# Patient Record
Sex: FEMALE | Race: WHITE | Employment: PART TIME | URBAN - METROPOLITAN AREA
[De-identification: names, ages, dates, MRNs, and addresses within clinical notes are randomized per-mention and may not be internally consistent; named-entity substitution may affect disease eponyms.]

---

## 2018-11-30 PROBLEM — E66.01 OBESITY, MORBID (HCC): Status: ACTIVE | Noted: 2018-11-30

## 2019-01-25 ENCOUNTER — HOSPITAL ENCOUNTER (OUTPATIENT)
Dept: GENERAL RADIOLOGY | Age: 59
Discharge: HOME | End: 2019-01-25
Payer: COMMERCIAL

## 2019-01-25 DIAGNOSIS — M79.671 RIGHT FOOT PAIN: ICD-10-CM

## 2019-01-25 PROBLEM — G47.33 OSA (OBSTRUCTIVE SLEEP APNEA): Status: ACTIVE | Noted: 2019-01-25

## 2019-01-25 PROCEDURE — 73630 X-RAY EXAM OF FOOT: CPT

## 2019-03-08 PROBLEM — C80.1 CANCER (HCC): Status: ACTIVE | Noted: 2019-03-08

## 2019-03-08 PROBLEM — E11.9 CONTROLLED TYPE 2 DIABETES MELLITUS, WITHOUT LONG-TERM CURRENT USE OF INSULIN (HCC): Status: ACTIVE | Noted: 2019-03-08

## 2019-03-08 PROBLEM — I10 HYPERTENSION: Status: ACTIVE | Noted: 2019-03-08

## 2019-03-22 ENCOUNTER — HOSPITAL ENCOUNTER (OUTPATIENT)
Dept: SLEEP MEDICINE | Age: 59
Discharge: HOME | End: 2019-03-22
Payer: COMMERCIAL

## 2019-03-22 PROCEDURE — 95806 SLEEP STUDY UNATT&RESP EFFT: CPT

## 2019-09-06 ENCOUNTER — HOSPITAL ENCOUNTER (OUTPATIENT)
Dept: MAMMOGRAPHY | Age: 59
Discharge: HOME | End: 2019-09-06
Attending: FAMILY MEDICINE
Payer: COMMERCIAL

## 2019-09-06 DIAGNOSIS — Z12.39 BREAST SCREENING, UNSPECIFIED: ICD-10-CM

## 2019-09-06 PROCEDURE — 77067 SCR MAMMO BI INCL CAD: CPT

## 2019-09-06 NOTE — LETTER
[facility] [address] [address] [address]                                       Date: 9/26/2019  
[address] [place], [address] [place] [address] Street Dear MsTye Koki Heard, Thank you for [facility] for your [facility]. We are pleased to inform you that the results of your recent breast imaging examination done on 9/6/19 are normal/benign (not cancer). To continue your breast health regimen the Radiologist has recommended:  
Screening Mammogram in 1 year - Bilateral  
 
  
Although mammography is the most accurate method for early detection, not all cancers are found through mammography. A breast finding of concern should never be ignored despite a normal mammogram. Any changes in your breast(s) should be brought to the attention of your healthcare provider immediately. Your images will become part of your medical file here at [address] and will be available for your continuing care. You are responsible for informing any new healthcare provider or breast imaging facility of the date and location of this examination. Sincerely,  
  
[facility] [address] If you have Medicare Insurance, you must make your appointment no sooner than 12 months from now.

## 2020-02-21 ENCOUNTER — HOSPITAL ENCOUNTER (OUTPATIENT)
Dept: GENERAL RADIOLOGY | Age: 60
Discharge: HOME | End: 2020-02-21
Payer: COMMERCIAL

## 2020-02-21 DIAGNOSIS — M79.671 HEEL PAIN, CHRONIC, RIGHT: ICD-10-CM

## 2020-02-21 DIAGNOSIS — M54.2 NECK PAIN: ICD-10-CM

## 2020-02-21 DIAGNOSIS — G89.29 HEEL PAIN, CHRONIC, RIGHT: ICD-10-CM

## 2020-02-21 PROCEDURE — 72040 X-RAY EXAM NECK SPINE 2-3 VW: CPT

## 2020-02-21 PROCEDURE — 73650 X-RAY EXAM OF HEEL: CPT

## 2020-02-21 NOTE — PROGRESS NOTES
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 We can send her to physical therapy but really everything is in line and this is just an arthritic problem. He also has some arthritis in her heel and going to see the orthopedist is the right thing to do. Have her let me know if she would like to go to physical therapy with regards to her neck otherwise I like to check her back in 4 to 6 weeks.

## 2020-12-22 PROBLEM — M47.812 SPONDYLOSIS OF CERVICAL JOINT: Status: ACTIVE | Noted: 2020-12-22

## 2020-12-22 PROBLEM — Z85.828 HISTORY OF SKIN CANCER: Status: ACTIVE | Noted: 2019-03-08

## 2020-12-22 PROBLEM — M19.171 POST-TRAUMATIC OSTEOARTHRITIS OF RIGHT ANKLE: Status: ACTIVE | Noted: 2020-12-22

## 2021-03-30 ENCOUNTER — APPOINTMENT (RX ONLY)
Dept: URBAN - METROPOLITAN AREA CLINIC 23 | Facility: CLINIC | Age: 61
Setting detail: DERMATOLOGY
End: 2021-03-30

## 2021-03-30 DIAGNOSIS — L57.8 OTHER SKIN CHANGES DUE TO CHRONIC EXPOSURE TO NONIONIZING RADIATION: ICD-10-CM

## 2021-03-30 DIAGNOSIS — D18.0 HEMANGIOMA: ICD-10-CM

## 2021-03-30 DIAGNOSIS — L82.1 OTHER SEBORRHEIC KERATOSIS: ICD-10-CM

## 2021-03-30 DIAGNOSIS — Z71.89 OTHER SPECIFIED COUNSELING: ICD-10-CM

## 2021-03-30 DIAGNOSIS — D22 MELANOCYTIC NEVI: ICD-10-CM

## 2021-03-30 DIAGNOSIS — L57.0 ACTINIC KERATOSIS: ICD-10-CM

## 2021-03-30 PROBLEM — D22.71 MELANOCYTIC NEVI OF RIGHT LOWER LIMB, INCLUDING HIP: Status: ACTIVE | Noted: 2021-03-30

## 2021-03-30 PROBLEM — D18.01 HEMANGIOMA OF SKIN AND SUBCUTANEOUS TISSUE: Status: ACTIVE | Noted: 2021-03-30

## 2021-03-30 PROCEDURE — 99203 OFFICE O/P NEW LOW 30 MIN: CPT

## 2021-03-30 PROCEDURE — ? PRESCRIPTION

## 2021-03-30 PROCEDURE — ? COUNSELING

## 2021-03-30 RX ORDER — FLUOROURACIL 2 G/40G
CREAM TOPICAL ONCE DAILY
Qty: 1 | Refills: 0 | Status: ERX | COMMUNITY
Start: 2021-03-30

## 2021-03-30 RX ADMIN — FLUOROURACIL: 2 CREAM TOPICAL at 00:00

## 2021-03-30 ASSESSMENT — LOCATION SIMPLE DESCRIPTION DERM
LOCATION SIMPLE: RIGHT THIGH
LOCATION SIMPLE: LEFT SHOULDER
LOCATION SIMPLE: RIGHT UPPER BACK
LOCATION SIMPLE: CHEST
LOCATION SIMPLE: RIGHT CHEEK
LOCATION SIMPLE: RIGHT FOREHEAD
LOCATION SIMPLE: LEFT LOWER BACK
LOCATION SIMPLE: ABDOMEN

## 2021-03-30 ASSESSMENT — LOCATION ZONE DERM
LOCATION ZONE: TRUNK
LOCATION ZONE: LEG
LOCATION ZONE: FACE
LOCATION ZONE: ARM

## 2021-03-30 ASSESSMENT — LOCATION DETAILED DESCRIPTION DERM
LOCATION DETAILED: RIGHT SUPERIOR MEDIAL UPPER BACK
LOCATION DETAILED: LEFT SUPERIOR LATERAL MIDBACK
LOCATION DETAILED: RIGHT INFERIOR MEDIAL FOREHEAD
LOCATION DETAILED: LEFT ANTERIOR SHOULDER
LOCATION DETAILED: RIGHT ANTERIOR DISTAL THIGH
LOCATION DETAILED: UPPER STERNUM
LOCATION DETAILED: RIGHT FOREHEAD
LOCATION DETAILED: LEFT LATERAL ABDOMEN
LOCATION DETAILED: RIGHT INFERIOR MEDIAL MALAR CHEEK

## 2021-03-30 NOTE — HPI: FULL BODY SKIN EXAMINATION
What Is The Reason For Today's Visit?: Full Body Skin Examination
What Is The Reason For Today's Visit? (Being Monitored For X): concerning skin lesions on an annual basis
How Severe Are Your Spot(S)?: mild
Additional History: Pt gave verbal consent for treatment/bx today. Witnessed by melisa

## 2021-04-09 ENCOUNTER — HOSPITAL ENCOUNTER (OUTPATIENT)
Dept: MAMMOGRAPHY | Age: 61
Discharge: HOME | End: 2021-04-09
Attending: NURSE PRACTITIONER
Payer: COMMERCIAL

## 2021-04-09 DIAGNOSIS — Z12.31 BREAST CANCER SCREENING BY MAMMOGRAM: ICD-10-CM

## 2021-04-09 PROCEDURE — 77067 SCR MAMMO BI INCL CAD: CPT

## 2021-04-13 NOTE — PROGRESS NOTES
Her mammogram showed no evidence for malignancy. A follow-up is recommended for routine mammogram screening next year.

## 2021-06-21 ENCOUNTER — APPOINTMENT (RX ONLY)
Dept: URBAN - METROPOLITAN AREA CLINIC 23 | Facility: CLINIC | Age: 61
Setting detail: DERMATOLOGY
End: 2021-06-21

## 2021-06-21 DIAGNOSIS — L57.0 ACTINIC KERATOSIS: ICD-10-CM

## 2021-06-21 DIAGNOSIS — L57.8 OTHER SKIN CHANGES DUE TO CHRONIC EXPOSURE TO NONIONIZING RADIATION: ICD-10-CM

## 2021-06-21 PROCEDURE — 99213 OFFICE O/P EST LOW 20 MIN: CPT

## 2021-06-21 PROCEDURE — ? TREATMENT REGIMEN

## 2021-06-21 PROCEDURE — ? OTHER

## 2021-06-21 PROCEDURE — ? COUNSELING

## 2021-06-21 ASSESSMENT — LOCATION SIMPLE DESCRIPTION DERM
LOCATION SIMPLE: RIGHT FOREHEAD
LOCATION SIMPLE: RIGHT NOSE

## 2021-06-21 ASSESSMENT — LOCATION DETAILED DESCRIPTION DERM
LOCATION DETAILED: RIGHT NASAL SIDEWALL
LOCATION DETAILED: RIGHT INFERIOR FOREHEAD
LOCATION DETAILED: RIGHT MEDIAL FOREHEAD

## 2021-06-21 ASSESSMENT — LOCATION ZONE DERM
LOCATION ZONE: NOSE
LOCATION ZONE: FACE

## 2021-06-21 NOTE — PROCEDURE: OTHER
Note Text (......Xxx Chief Complaint.): This diagnosis correlates with the
Other (Free Text): AK’s treated with efudex have resolved, good response
Detail Level: Detailed
Render Risk Assessment In Note?: no

## 2021-07-27 ENCOUNTER — HOSPITAL ENCOUNTER (OUTPATIENT)
Dept: ULTRASOUND IMAGING | Age: 61
Discharge: HOME | End: 2021-07-27
Attending: NURSE PRACTITIONER
Payer: COMMERCIAL

## 2021-07-27 DIAGNOSIS — R79.89 ELEVATED LFTS: ICD-10-CM

## 2021-07-27 PROCEDURE — 76705 ECHO EXAM OF ABDOMEN: CPT

## 2021-07-27 NOTE — PROGRESS NOTES
Ultrasound compatible with fatty liver. For fatty liver treatment, it is recommended to follow strict low sugar/diabetic diet, exercise 150 mins/week, weight loss, and healthy diet avoiding high saturated fat/cholesterol foods. We will continue to trend her liver function tests periodically. If she has any questions, please let me know. Thanks.

## 2021-11-19 PROBLEM — K76.0 FATTY LIVER: Status: ACTIVE | Noted: 2021-11-19

## 2022-02-18 PROBLEM — N39.0 URINARY TRACT INFECTION WITH HEMATURIA: Status: ACTIVE | Noted: 2022-02-18

## 2022-02-18 PROBLEM — R30.0 DYSURIA: Status: ACTIVE | Noted: 2022-02-18

## 2022-02-18 PROBLEM — R31.9 URINARY TRACT INFECTION WITH HEMATURIA: Status: ACTIVE | Noted: 2022-02-18

## 2022-03-18 PROBLEM — R31.9 URINARY TRACT INFECTION WITH HEMATURIA: Status: ACTIVE | Noted: 2022-02-18

## 2022-03-18 PROBLEM — N39.0 URINARY TRACT INFECTION WITH HEMATURIA: Status: ACTIVE | Noted: 2022-02-18

## 2022-03-18 PROBLEM — M19.171 POST-TRAUMATIC OSTEOARTHRITIS OF RIGHT ANKLE: Status: ACTIVE | Noted: 2020-12-22

## 2022-03-18 PROBLEM — K76.0 FATTY LIVER: Status: ACTIVE | Noted: 2021-11-19

## 2022-03-19 PROBLEM — E11.9 CONTROLLED TYPE 2 DIABETES MELLITUS, WITHOUT LONG-TERM CURRENT USE OF INSULIN (HCC): Status: ACTIVE | Noted: 2019-03-08

## 2022-03-19 PROBLEM — Z85.828 HISTORY OF SKIN CANCER: Status: ACTIVE | Noted: 2019-03-08

## 2022-03-19 PROBLEM — G47.33 OSA (OBSTRUCTIVE SLEEP APNEA): Status: ACTIVE | Noted: 2019-01-25

## 2022-03-19 PROBLEM — I10 HYPERTENSION: Status: ACTIVE | Noted: 2019-03-08

## 2022-03-19 PROBLEM — E66.01 OBESITY, MORBID (HCC): Status: ACTIVE | Noted: 2018-11-30

## 2022-03-19 PROBLEM — R30.0 DYSURIA: Status: ACTIVE | Noted: 2022-02-18

## 2022-03-20 PROBLEM — M47.812 SPONDYLOSIS OF CERVICAL JOINT: Status: ACTIVE | Noted: 2020-12-22

## 2022-04-04 ENCOUNTER — APPOINTMENT (RX ONLY)
Dept: URBAN - METROPOLITAN AREA CLINIC 23 | Facility: CLINIC | Age: 62
Setting detail: DERMATOLOGY
End: 2022-04-04

## 2022-04-04 DIAGNOSIS — L85.3 XEROSIS CUTIS: ICD-10-CM

## 2022-04-04 DIAGNOSIS — L57.8 OTHER SKIN CHANGES DUE TO CHRONIC EXPOSURE TO NONIONIZING RADIATION: ICD-10-CM

## 2022-04-04 DIAGNOSIS — D22 MELANOCYTIC NEVI: ICD-10-CM

## 2022-04-04 DIAGNOSIS — L81.4 OTHER MELANIN HYPERPIGMENTATION: ICD-10-CM

## 2022-04-04 DIAGNOSIS — D18.0 HEMANGIOMA: ICD-10-CM

## 2022-04-04 DIAGNOSIS — L57.0 ACTINIC KERATOSIS: ICD-10-CM | Status: RESOLVED

## 2022-04-04 DIAGNOSIS — L82.1 OTHER SEBORRHEIC KERATOSIS: ICD-10-CM

## 2022-04-04 PROBLEM — D22.71 MELANOCYTIC NEVI OF RIGHT LOWER LIMB, INCLUDING HIP: Status: ACTIVE | Noted: 2022-04-04

## 2022-04-04 PROBLEM — D18.01 HEMANGIOMA OF SKIN AND SUBCUTANEOUS TISSUE: Status: ACTIVE | Noted: 2022-04-04

## 2022-04-04 PROCEDURE — ? COUNSELING

## 2022-04-04 PROCEDURE — 99213 OFFICE O/P EST LOW 20 MIN: CPT

## 2022-04-04 PROCEDURE — ? OTHER

## 2022-04-04 ASSESSMENT — LOCATION SIMPLE DESCRIPTION DERM
LOCATION SIMPLE: RIGHT THIGH
LOCATION SIMPLE: CHEST
LOCATION SIMPLE: UPPER BACK
LOCATION SIMPLE: RIGHT FOREARM
LOCATION SIMPLE: UPPER LIP
LOCATION SIMPLE: NOSE
LOCATION SIMPLE: GROIN
LOCATION SIMPLE: LOWER BACK
LOCATION SIMPLE: ABDOMEN
LOCATION SIMPLE: LEFT FOREARM
LOCATION SIMPLE: LEFT SHOULDER
LOCATION SIMPLE: RIGHT SHOULDER
LOCATION SIMPLE: LEFT UPPER BACK
LOCATION SIMPLE: LEFT SCALP

## 2022-04-04 ASSESSMENT — LOCATION DETAILED DESCRIPTION DERM
LOCATION DETAILED: PHILTRUM
LOCATION DETAILED: EPIGASTRIC SKIN
LOCATION DETAILED: NASAL DORSUM
LOCATION DETAILED: LEFT PROXIMAL DORSAL FOREARM
LOCATION DETAILED: UPPER STERNUM
LOCATION DETAILED: LEFT POSTERIOR SHOULDER
LOCATION DETAILED: SUPERIOR THORACIC SPINE
LOCATION DETAILED: RIGHT DISTAL RADIAL DORSAL FOREARM
LOCATION DETAILED: LEFT SUPRAPUBIC SKIN
LOCATION DETAILED: RIGHT ANTERIOR DISTAL THIGH
LOCATION DETAILED: RIGHT PROXIMAL DORSAL FOREARM
LOCATION DETAILED: SUPERIOR LUMBAR SPINE
LOCATION DETAILED: LEFT MEDIAL FRONTAL SCALP
LOCATION DETAILED: LEFT ANTERIOR SHOULDER
LOCATION DETAILED: RIGHT POSTERIOR SHOULDER
LOCATION DETAILED: LEFT MID-UPPER BACK

## 2022-04-04 ASSESSMENT — LOCATION ZONE DERM
LOCATION ZONE: LEG
LOCATION ZONE: ARM
LOCATION ZONE: NOSE
LOCATION ZONE: SCALP
LOCATION ZONE: LIP
LOCATION ZONE: TRUNK

## 2022-04-04 NOTE — PROCEDURE: OTHER
Render Risk Assessment In Note?: no
Detail Level: Detailed
Note Text (......Xxx Chief Complaint.): This diagnosis correlates with the
Other (Free Text): Treated with Efudex

## 2022-04-08 ENCOUNTER — HOSPITAL ENCOUNTER (OUTPATIENT)
Dept: MAMMOGRAPHY | Age: 62
Discharge: HOME | End: 2022-04-08
Attending: NURSE PRACTITIONER
Payer: COMMERCIAL

## 2022-04-08 DIAGNOSIS — Z80.3 FAMILY HISTORY OF BREAST CANCER IN MOTHER: ICD-10-CM

## 2022-04-08 DIAGNOSIS — N63.20 MASS OF LEFT BREAST, UNSPECIFIED QUADRANT: ICD-10-CM

## 2022-04-08 PROBLEM — Z86.010 HISTORY OF COLON POLYPS: Status: ACTIVE | Noted: 2022-04-08

## 2022-04-08 PROBLEM — K21.9 GASTROESOPHAGEAL REFLUX DISEASE WITHOUT ESOPHAGITIS: Status: ACTIVE | Noted: 2022-04-08

## 2022-04-08 PROCEDURE — 77062 BREAST TOMOSYNTHESIS BI: CPT

## 2022-04-08 PROCEDURE — 76642 ULTRASOUND BREAST LIMITED: CPT

## 2022-04-11 PROBLEM — K21.9 GASTROESOPHAGEAL REFLUX DISEASE WITHOUT ESOPHAGITIS: Status: ACTIVE | Noted: 2022-04-08

## 2022-04-11 PROBLEM — Z86.0100 HISTORY OF COLON POLYPS: Status: ACTIVE | Noted: 2022-04-08

## 2022-04-11 PROBLEM — Z86.010 HISTORY OF COLON POLYPS: Status: ACTIVE | Noted: 2022-04-08

## 2022-04-11 NOTE — PROGRESS NOTES
Patient notified and verbalized understanding    Pt scheduled for labs and she will call Rolando Recinos to get apt scheduled

## 2022-04-11 NOTE — PROGRESS NOTES
Her ultrasound and mammogram showed possible small abscess. I ordered Keflex antibiotic to take twice daily for 10 days. She will need to follow up in 4 weeks with [facility] for follow up imaging. Does she have an appt? Her urine was most likely contaminated. The specimen did show some blood. Please schedule for her to resubmit a urine sample (clean catch) in about 4 weeks for lab visit.

## 2022-05-06 ENCOUNTER — HOSPITAL ENCOUNTER (OUTPATIENT)
Dept: MAMMOGRAPHY | Age: 62
Discharge: HOME | End: 2022-05-06
Attending: NURSE PRACTITIONER
Payer: COMMERCIAL

## 2022-05-06 DIAGNOSIS — N64.9 LESION OF BREAST: ICD-10-CM

## 2022-05-06 PROCEDURE — 76642 ULTRASOUND BREAST LIMITED: CPT

## 2022-05-13 ENCOUNTER — HOSPITAL ENCOUNTER (OUTPATIENT)
Dept: MAMMOGRAPHY | Age: 62
Discharge: HOME | End: 2022-05-13
Attending: NURSE PRACTITIONER
Payer: COMMERCIAL

## 2022-05-13 VITALS — HEART RATE: 73 BPM | SYSTOLIC BLOOD PRESSURE: 125 MMHG | DIASTOLIC BLOOD PRESSURE: 68 MMHG

## 2022-05-13 DIAGNOSIS — N63.20 MASS OF LEFT BREAST: ICD-10-CM

## 2022-05-13 DIAGNOSIS — R92.8 ABNORMAL MAMMOGRAM OF LEFT BREAST: ICD-10-CM

## 2022-05-13 PROCEDURE — 74011000250 HC RX REV CODE- 250: Performed by: NURSE PRACTITIONER

## 2022-05-13 PROCEDURE — 88305 TISSUE EXAM BY PATHOLOGIST: CPT

## 2022-05-13 PROCEDURE — 19083 BX BREAST 1ST LESION US IMAG: CPT

## 2022-05-13 PROCEDURE — 77065 DX MAMMO INCL CAD UNI: CPT

## 2022-05-13 RX ORDER — LIDOCAINE HYDROCHLORIDE 10 MG/ML
8 INJECTION INFILTRATION; PERINEURAL
Status: COMPLETED | OUTPATIENT
Start: 2022-05-13 | End: 2022-05-13

## 2022-05-13 RX ADMIN — LIDOCAINE HYDROCHLORIDE 8 ML: 10 INJECTION, SOLUTION INFILTRATION; PERINEURAL at 08:50

## 2022-06-16 ENCOUNTER — OFFICE VISIT (OUTPATIENT)
Dept: FAMILY MEDICINE CLINIC | Facility: CLINIC | Age: 62
End: 2022-06-16
Payer: COMMERCIAL

## 2022-06-16 VITALS
BODY MASS INDEX: 40.92 KG/M2 | DIASTOLIC BLOOD PRESSURE: 80 MMHG | HEART RATE: 93 BPM | OXYGEN SATURATION: 96 % | HEIGHT: 68 IN | SYSTOLIC BLOOD PRESSURE: 122 MMHG | TEMPERATURE: 98.8 F | WEIGHT: 270 LBS

## 2022-06-16 DIAGNOSIS — N63.32 MASS OF AXILLARY TAIL OF LEFT BREAST: ICD-10-CM

## 2022-06-16 DIAGNOSIS — Z80.3 FAMILY HISTORY OF BREAST CANCER IN MOTHER: ICD-10-CM

## 2022-06-16 DIAGNOSIS — R89.7 ABNORMAL BREAST BIOPSY: Primary | ICD-10-CM

## 2022-06-16 DIAGNOSIS — Z85.828 HISTORY OF SKIN CANCER: ICD-10-CM

## 2022-06-16 DIAGNOSIS — M25.511 ACUTE PAIN OF RIGHT SHOULDER: ICD-10-CM

## 2022-06-16 PROCEDURE — 99214 OFFICE O/P EST MOD 30 MIN: CPT | Performed by: NURSE PRACTITIONER

## 2022-06-16 ASSESSMENT — ENCOUNTER SYMPTOMS
SORE THROAT: 0
TROUBLE SWALLOWING: 0
RESPIRATORY NEGATIVE: 1
GASTROINTESTINAL NEGATIVE: 1

## 2022-06-16 ASSESSMENT — PATIENT HEALTH QUESTIONNAIRE - PHQ9
SUM OF ALL RESPONSES TO PHQ QUESTIONS 1-9: 0
SUM OF ALL RESPONSES TO PHQ QUESTIONS 1-9: 0
SUM OF ALL RESPONSES TO PHQ9 QUESTIONS 1 & 2: 0
SUM OF ALL RESPONSES TO PHQ QUESTIONS 1-9: 0
SUM OF ALL RESPONSES TO PHQ QUESTIONS 1-9: 0
2. FEELING DOWN, DEPRESSED OR HOPELESS: 0
1. LITTLE INTEREST OR PLEASURE IN DOING THINGS: 0

## 2022-06-16 NOTE — PROGRESS NOTES
KIARA Bass is a 63 y.o.  female presenting for :  Chief Complaint   Patient presents with    Shoulder Injury     right side    Discuss Labs       Current Outpatient Medications   Medication Sig Dispense Refill    aspirin 81 MG EC tablet Take 81 mg by mouth daily      atorvastatin (LIPITOR) 40 MG tablet Take 40 mg by mouth daily      cephALEXin (KEFLEX) 500 MG capsule Take 500 mg by mouth 2 times daily      diclofenac sodium (VOLTAREN) 1 % GEL Apply 4 g topically 4 times daily as needed      lisinopril-hydroCHLOROthiazide (PRINZIDE;ZESTORETIC) 20-25 MG per tablet Take 0.5 tablets by mouth daily      metFORMIN (GLUCOPHAGE) 1000 MG tablet TAKE 1 TABLET BY MOUTH TWICE A DAY WITH MEALS      omeprazole (PRILOSEC) 20 MG delayed release capsule Take 20 mg by mouth daily       No current facility-administered medications for this visit. 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    Diabetes ([facility] [address]     Fundic gland polyposis of stomach 03/17/2017    GERD (gastroesophageal reflux disease)     Hypertension        Past Surgical History:   Procedure Laterality Date    HEENT      wisdom teeth    ORTHOPEDIC SURGERY Right 2005    OTHER SURGICAL HISTORY  1994    cancer removal    US BREAST NEEDLE BIOPSY LEFT Left 5/13/2022    US BREAST NEEDLE BIOPSY LEFT 5/13/2022 [facility]       Family History   Problem Relation Age of Onset    Cancer Mother         breast        Social History     Socioeconomic History    Marital status:      Spouse name: Not on file    Number of children: Not on file    Years of education: Not on file    Highest education level: Not on file   Occupational History    Not on file   Tobacco Use    Smoking status: Never Smoker    Smokeless tobacco: Never Used   Substance and Sexual Activity    Alcohol use:  Yes  Drug use: No    Sexual activity: Not on file   Other Topics Concern    Not on file   Social History Narrative    Not on file     Social Determinants of Health     Financial Resource Strain:     Difficulty of Paying Living Expenses: Not on file   Food Insecurity:     Worried About Running Out of Food in the Last Year: Not on file    Uriel of Food in the Last Year: Not on file   Transportation Needs:     Lack of Transportation (Medical): Not on file    Lack of Transportation (Non-Medical): Not on file   Physical Activity:     Days of Exercise per Week: Not on file    Minutes of Exercise per Session: Not on file   Stress:     Feeling of Stress : Not on file   Social Connections:     Frequency of Communication with Friends and Family: Not on file    Frequency of Social Gatherings with Friends and Family: Not on file    Attends Restorationist Services: Not on file    Active Member of [address] [place] [address] [employer] or Organizations: Not on file    Attends Club or Organization Meetings: Not on file    Marital Status: Not on file   Intimate Partner Violence:     Fear of Current or Ex-Partner: Not on file    Emotionally Abused: Not on file    Physically Abused: Not on file    Sexually Abused: Not on file   Housing Stability:     Unable to Pay for Housing in the Last Year: Not on file    Number of [place] in the Last Year: Not on file    Unstable Housing in the Last Year: Not on file     Pt presents accompanied by her  for acute visit for right shoulder pain and to discuss her breast biopsy results. She reports she was recommended 6-month follow-up, but she is concerned with her history of cancer would like to be evaluated sooner and she believes the breast lump is getting larger. Initially, when first seen in April, the lump was seen 2 days prior to visit. Took antibiotic and did not get better. Gotten larger over several months. The lump is nonpainful with no other associated symptoms.       She would like her tongue checked as the color has not returned to its normal pink since being treated for the oral thrush. She reports onset right shoulder pain this week after going to the gym working on the pull back press. Initially, she was not able to move her arm past mid right shoulder level and having numbness and tingling down arm. Advil and icing helped. She feels much improved with better range of motion but decided to still come to her appt as she wanted to discuss biopsy result. She denies any swelling, erythema, or bruising. No prior injury or surgery to her right arm or shoulder. Review of Systems   Constitutional: Positive for fatigue. Negative for appetite change, chills and fever. HENT: Negative for mouth sores, sore throat and trouble swallowing. Sour taste and annoying. Eyes: Negative for visual disturbance. Respiratory: Negative. Deconditioned breathing not as active. Cardiovascular: Negative. Gastrointestinal: Negative. Takes omeprazole every day. No night time reflux. Endocrine: Negative for cold intolerance. Genitourinary: Negative. Musculoskeletal: Positive for myalgias. Right shoulder. Neurological: Positive for numbness and headaches. Negative for dizziness. Numbness and tingling shoulder now better. Occasional headaches. Hematological: Negative for adenopathy. PE    /80   Pulse 93   Temp 98.8 °F (37.1 °C)   Ht 5' 8\" (1.727 m)   Wt 270 lb (122.5 kg)   SpO2 96%   BMI 41.05 kg/m²     Physical Exam  Constitutional:       General: She is not in acute distress. Appearance: Normal appearance. She is obese. She is not ill-appearing, toxic-appearing or diaphoretic. HENT:      Head: Normocephalic and atraumatic. Right Ear: External ear normal.      Left Ear: External ear normal.      Mouth/Throat:      Mouth: Mucous membranes are moist.      Tongue: No lesions.       Comments: No tongue discoloration or thrush noted. Eyes:      Extraocular Movements: Extraocular movements intact. Conjunctiva/sclera: Conjunctivae normal.      Pupils: Pupils are equal, round, and reactive to light. Cardiovascular:      Rate and Rhythm: Normal rate and regular rhythm. Pulses: Normal pulses. Heart sounds: Normal heart sounds. Pulmonary:      Effort: Pulmonary effort is normal.      Breath sounds: Normal breath sounds. Chest:   Breasts:      Right: No mass, axillary adenopathy or supraclavicular adenopathy. Left: Mass present. No axillary adenopathy or supraclavicular adenopathy. Comments: Left breast mass firm and non-tender approx 1 cm left upper outer breast region along tail of breast tissue  Abdominal:      General: Bowel sounds are normal.      Palpations: Abdomen is soft. Musculoskeletal:         General: Normal range of motion. Cervical back: Normal range of motion and neck supple. Right lower leg: No edema. Left lower leg: No edema. Lymphadenopathy:      Head:      Right side of head: No submental, submandibular, tonsillar, preauricular, posterior auricular or occipital adenopathy. Left side of head: No submental, submandibular, tonsillar, preauricular, posterior auricular or occipital adenopathy. Cervical: No cervical adenopathy. Upper Body:      Right upper body: No supraclavicular or axillary adenopathy. Left upper body: No supraclavicular or axillary adenopathy. Lower Body: No right inguinal adenopathy. No left inguinal adenopathy. Skin:     General: Skin is warm and dry. Coloration: Skin is not jaundiced or pale. Neurological:      General: No focal deficit present. Mental Status: She is alert and oriented to person, place, and time. Psychiatric:         Mood and Affect: Mood normal.         Behavior: Behavior normal.         Thought Content:  Thought content normal.         Judgment: Judgment normal.          A/P    Zack Reddy was seen today for shoulder injury and discuss labs. Diagnoses and all orders for this visit:    Abnormal breast biopsy  -     [address] [facility]    Mass of axillary tail of left breast  -     [address] [facility]    History of skin cancer  -     [address] [facility]    Family history of breast cancer in mother  -     [address] [facility]    Acute pain of right shoulder        Keep August appt. Discussed with patient and spouse recommend hematology follow-up for further evaluation and recommendations. Discussed with patient and spouse the biopsy findings cancerous tumor was not found there was an inflammatory finding is most consistent with reactive lymph node, focal involvement of lymph node by low grade lymphona cannot be ruled out. No further questions or concerns at this time. Recommended patient continue local care to her right shoulder, massage, stretching, and warm up prior to exercising. Recommended possible  to show technique. Recommended over-the-counter mouthwash as needed. Review plan with collaborating physician Dr. Stefan Nunez MD who agrees with plan. No results found for this visit on 06/16/22.      SHIRLEY Heck CNP    Note has been electronically signed by provider`

## 2022-06-17 ENCOUNTER — TELEPHONE (OUTPATIENT)
Dept: FAMILY MEDICINE CLINIC | Facility: CLINIC | Age: 62
End: 2022-06-17

## 2022-06-17 DIAGNOSIS — Z86.010 HISTORY OF COLON POLYPS: ICD-10-CM

## 2022-06-17 DIAGNOSIS — Z87.898 HISTORY OF SOLITARY PULMONARY NODULE: ICD-10-CM

## 2022-06-17 DIAGNOSIS — Z12.11 COLON CANCER SCREENING: Primary | ICD-10-CM

## 2022-06-17 NOTE — TELEPHONE ENCOUNTER
Pt agrees now to proceed with colonoscopy overdue since March. She wanted to wait until summer when school is out to complete. She does remember remotely being told she had a pulmonary nodule, but was never told anything about follow up. Requested pt sign a release to have those images sent over. Pt had notation on [facility] note solitary pulmonary nodule as a diagnosis. Pt voices understanding to notify office if she does not get scheduled by the end of next week.

## 2022-06-24 NOTE — PROGRESS NOTES
New Patient Abstract    Reason for Referral: Abnormal breast biopsy; Mass of axillary tail of left breast; History of skin cancer; Family history of breast cancer in mother    Referring Provider: SHIRLEY Pierre CNP    Primary Care Provider: SHIRLEY Pierre CNP    Family History of Cancer/Hematologic Disorders: Family history is significant for mother with breast cancer. Presenting Symptoms: Lump in left breast axillary tail    Narrative with recent with Results/Procedures/Biopsies and Dates completed: Ms. 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 On 4/8/22 she presented to her PCPs office with complaint of left breast lump. Physical exam confirmed a firm and non-tender left breast mass measuring approximately 1 cm in the left upper outer breast region along the tail of breast tissue. Further evaluation with bilateral diagnostic digital mammogram with tomosynthesis and left breast ultrasound on 4/8/22 identified a 1.1 cm hypoechoic lesion in the left axillary tail that initially appeared inflammatory, possibly a small abscess. Treatment with antibiotics and follow-up ultrasound in 3-4 weeks to document resolution was recommended. Patient was treated with antibiotics and repeat left breast ultrasound was performed on 5/6/22. Ultrasound showed a persistent masslike area in the left axillary tail which was still thought to most likely be inflammatory but was unimproved with antibiotic treatment, and ultrasound guided biopsy/aspiration was recommended to exclude an atypical tumor or infection.      Recommended ultrasound-guided biopsy of the left axillary tail mass was completed on 5/13/22 with pathology revealing tissue consistent with needle biopsy of lymph node having foamy histiocytes in association with acute inflammation, nondiagnostic for metastatic tumor. Pathologist noted, While metastatic malignant tumor is not identified in this material, unsampled lymph node could have such findings. While intact architecture is most consistent with reactive lymph node, focal involvement of lymph node by low grade lymphoma cannot be ruled out on H&E morphology alone.  Patient was consequently recommended 6-month follow-up. She returned to her PCP on 6/17/22 to discuss her breast biopsy results. She reported being recommended 6-month follow-up, but she was concerned with her history of cancer and requested to be evaluated sooner. PCP noted that patient believes the breast lump is getting larger. Referral was placed to [facility] for Medical Oncology evaluation and treatment recommendations. BILATERAL DIAGNOSTIC DIGITAL MAMMOGRAM WITH TOMOSYNTHESIS AND LEFT BREAST ULTRASOUND 4/8/22  FINDINGS: There is an area of ill-defined increased density in the left axillary tail which corresponds to the palpable abnormality. This is superior to prior lumpectomy site. No other masslike areas are seen in either breast.    LEFT BREAST ULTRASOUND: There is a 1.1 cm hypoechoic lesion in the left axillary tail. The surrounding tissue is echogenic. No other masslike areas are seen. IMPRESSION: Left axilla tail lesion appears inflammatory, possibly a small abscess. Suggest treatment with antibiotics. Follow-up ultrasound in 3-4 weeks to document resolution. BI-RADS Assessment Category 3: Probably benign- Short-interval follow-up  suggested. (#BRad3)    LEFT BREAST ULTRASOUND 5/6/22  FINDINGS: Ultrasound of the upper outer left breast shows a persistent hyperechoic area with central hypoechoic tissue, possibly fluid. The central hypoechoic area measures 1.1 cm. The larger hyperechoic region measures 3.6 x 1.6 cm.   There is no significant change in appearance compared with the prior  ultrasound from 04/08/2022. IMPRESSION: Persistent masslike area in the left axillary tail. Most likely inflammatory, but it has not improved with antibiotic treatment. Ultrasound guided biopsy/aspiration is recommended to exclude an atypical tumor or infection. BI-RADS Assessment Category 4: Suspicious Finding- Biopsy should be considered. (#BRad4)    [facility] SURGICAL PATHOLOGY REPORT 5/13/22      Notes from Referring Provider: None    Other Pertinent Information:   01/18/2021 (COVID-19, Pfizer Purple top, DILUTE for use, 12+ yrs, 30mcg/0.3mL dose)  02/05/2021 (COVID-19, Pfizer Purple top, DILUTE for use, 12+ yrs, 30mcg/0.3mL dose)  10/08/2021 (COVID-19, Pfizer Purple top, DILUTE for use, 12+ yrs, 30mcg/0.3mL dose)    Presented at Tumor Board:   No

## 2022-06-24 NOTE — PROGRESS NOTES
[facility] Hematology and Oncology: New Patient - Consultation    Chief Complaint   Patient presents with    New Patient     Reason for Referral: Abnormal breast biopsy; Mass of axillary tail of left breast; History of skin cancer; Family history of breast cancer in mother  Referring Provider: SHIRLEY Moore CNP  Family History of Cancer/Hematologic Disorders: Family history is significant for mother with breast cancer. Presenting Symptoms: Lump in left breast axillary tail    History of Present Illness:  Ms. Kobi Ocampo is a 63 y.o. female who presents today in referral from MELLISA Perry for consultation regarding recent mass in the axillary breast tail. 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  On 4/8/22 she presented to her PCPs office with complaint of left breast lump. Physical exam confirmed a firm and non-tender left breast mass measuring approximately 1 cm in the left upper outer breast region along the tail of breast tissue. Further evaluation with bilateral diagnostic digital mammogram with tomosynthesis and left breast ultrasound on 4/8/22 identified a 1.1 cm hypoechoic lesion in the left axillary tail that initially appeared inflammatory, possibly a small abscess. Treatment with antibiotics and follow-up ultrasound in 3-4 weeks to document resolution was recommended.  Patient was treated with antibiotics and repeat left breast ultrasound was performed on 5/6/22.   US showed a persistent masslike area in the left axillary tail which was still thought to most likely be inflammatory but was unimproved with antibiotic treatment, and ultrasound guided biopsy/aspiration was recommended to exclude an atypical tumor or infection.  Recommended ultrasound-guided biopsy of the left axillary tail mass was completed on 5/13/22 with pathology revealing tissue consistent with needle biopsy of lymph node having foamy histiocytes in association with acute inflammation, nondiagnostic for metastatic tumor. Pathologist noted, While metastatic malignant tumor is not identified in this material, unsampled lymph node could have such findings. While intact architecture is most consistent with reactive lymph node, focal involvement of lymph node by low grade lymphoma cannot be ruled out on H&E morphology alone.   Patient was consequently recommended 6-month follow-up. She returned to her PCP on 6/17/22 to discuss her breast biopsy results. She reported being recommended 6-month follow-up, but she was concerned with her history of cancer and requested to be evaluated sooner. PCP noted that patient believes the breast lump is getting larger.  Referral was placed to  for Medical Oncology evaluation and treatment recommendations. Daughter lives in [place] - has ET and sees heme/onc at [facility]. Today, she is here for consultation. We discussed her recent imaging and pathology in detail. Pt feels the mass has grown some since bx. She has a hx of left breast excision - years ago - benign findings. 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 She stated that this required 3 surgeries to obtain clear margins. No LN sampling at that time. She is ok with p/w MRI breast at this time and I will also refer her for sx consultation w Dr Sean Argueta. She also has a new lesion over her right medial breast - somewhat violacoious in color - I think this should be bx considering her hx. Appears different from her other cherry angiomas. She agrees with the plan. We discussed need for insurance pre-authorization for .   No B symptoms.       Chronological Events:   BILATERAL DIAGNOSTIC DIGITAL MAMMOGRAM WITH TOMOSYNTHESIS AND LEFT BREAST ULTRASOUND 4/8/22  FINDINGS: There is an area of ill-defined increased density in the left axillary tail which corresponds to the palpable abnormality. This is superior to prior lumpectomy site.  No other masslike areas are seen in either breast.    LEFT BREAST ULTRASOUND: There is a 1.1 cm hypoechoic lesion in the left axillary tail. The surrounding tissue is echogenic.  No other masslike areas are seen.   IMPRESSION: Left axilla tail lesion appears inflammatory, possibly a small abscess.  Suggest treatment with antibiotics.  Follow-up ultrasound in 3-4 weeks to document resolution.   BI-RADS Assessment Category 3: Probably benign- Short-interval follow-up  suggested. (#BRad3)     LEFT BREAST ULTRASOUND 5/6/22  FINDINGS: Ultrasound of the upper outer left breast shows a persistent hyperechoic area with central hypoechoic tissue, possibly fluid.  The central hypoechoic area measures 1.1 cm.  The larger hyperechoic region measures 3.6 x 1.6 cm.  There is no significant change in appearance compared with the prior  ultrasound from 04/08/2022. IMPRESSION: Persistent masslike area in the left axillary tail.  Most likely inflammatory, but it has not improved with antibiotic treatment.  Ultrasound guided biopsy/aspiration is recommended to exclude an atypical tumor or infection.   BI-RADS Assessment Category 4: Suspicious Finding- Biopsy should be considered. (#BRad4)     [facility] SURGICAL PATHOLOGY REPORT 5/13/22 6/27/22 heme/onc consultation       Family History   Problem Relation Age of Onset    Cancer Mother         breast      Social History     Socioeconomic History    Marital status:      Spouse name: Not on file    Number of children: Not on file    Years of education: Not on file    Highest education level: Not on file   Occupational History    Not on file   Tobacco Use    Smoking status: Never Smoker    Smokeless tobacco: Never Used   Substance and Sexual Activity    Alcohol use:  Yes    Drug use: No    Sexual activity: Not on file Other Topics Concern    Not on file   Social History Narrative    Not on file     Social Determinants of Health     Financial Resource Strain:     Difficulty of Paying Living Expenses: Not on file   Food Insecurity:     Worried About Running Out of Food in the Last Year: Not on file    Uriel of Food in the Last Year: Not on file   Transportation Needs:     Lack of Transportation (Medical): Not on file    Lack of Transportation (Non-Medical): Not on file   Physical Activity:     Days of Exercise per Week: Not on file    Minutes of Exercise per Session: Not on file   Stress:     Feeling of Stress : Not on file   Social Connections:     Frequency of Communication with Friends and Family: Not on file    Frequency of Social Gatherings with Friends and Family: Not on file    Attends [employer] Services: Not on file    Active Member of [address] [place] [address] or Organizations: Not on file    Attends Club or Organization Meetings: Not on file    Marital Status: Not on file   Intimate Partner Violence:     Fear of Current or Ex-Partner: Not on file    Emotionally Abused: Not on file    Physically Abused: Not on file    Sexually Abused: Not on file   Housing Stability:     Unable to Pay for Housing in the Last Year: Not on file    Number of [place] in the Last Year: Not on file    Unstable Housing in the Last Year: Not on file        Review of Systems   Constitutional: Negative for appetite change, chills, diaphoresis, fatigue, fever and unexpected weight change. HENT:   Negative for hearing loss, mouth sores, nosebleeds, sore throat, trouble swallowing and voice change. Eyes: Negative for icterus. Respiratory: Negative for chest tightness, hemoptysis, shortness of breath and wheezing. Cardiovascular: Negative for chest pain, leg swelling and palpitations. Gastrointestinal: Negative for abdominal distention, abdominal pain, blood in stool, constipation, diarrhea, nausea and vomiting.    Endocrine: Negative for hot flashes. Genitourinary: Negative for difficulty urinating, frequency, vaginal bleeding and vaginal discharge. Musculoskeletal: Negative for arthralgias, flank pain, gait problem and myalgias. Skin: Negative for itching, rash and wound. Neurological: Negative for dizziness, extremity weakness, gait problem, headaches and numbness. Psychiatric/Behavioral: Negative for confusion and depression. The patient is nervous/anxious. No Known Allergies  Past Medical History:   Diagnosis Date    Adenomatous polyp of colon 03/17/2017    Cancer ([facility] [address] 1993    skin; Dermatofibro sarcoma in the left upper abdomen    Diabetes ([facility] [address]     Fundic gland polyposis of stomach 03/17/2017    GERD (gastroesophageal reflux disease)     Hypertension      Past Surgical History:   Procedure Laterality Date    HEENT      wisdom teeth    ORTHOPEDIC SURGERY Right 2005    OTHER SURGICAL HISTORY  1994    cancer removal     BREAST NEEDLE BIOPSY LEFT Left 5/13/2022     BREAST NEEDLE BIOPSY LEFT 5/13/2022 [facility]     Current Outpatient Medications   Medication Sig Dispense Refill    aspirin 81 MG EC tablet Take 81 mg by mouth daily      atorvastatin (LIPITOR) 40 MG tablet Take 40 mg by mouth daily      lisinopril-hydroCHLOROthiazide (PRINZIDE;ZESTORETIC) 20-25 MG per tablet Take 0.5 tablets by mouth daily      metFORMIN (GLUCOPHAGE) 1000 MG tablet TAKE 1 TABLET BY MOUTH TWICE A DAY WITH MEALS      omeprazole (PRILOSEC) 20 MG delayed release capsule Take 20 mg by mouth daily      cephALEXin (KEFLEX) 500 MG capsule Take 500 mg by mouth 2 times daily (Patient not taking: Reported on 6/27/2022)      diclofenac sodium (VOLTAREN) 1 % GEL Apply 4 g topically 4 times daily as needed (Patient not taking: Reported on 6/27/2022)       No current facility-administered medications for this visit. No flowsheet data found.     OBJECTIVE:  /79 (Site: Right Upper Arm, Position: Standing) Pulse 90   Temp 98 °F (36.7 °C)   Resp 18   Ht 5' 8\" (1.727 m)   Wt 271 lb (122.9 kg)   SpO2 96%   BMI 41.21 kg/m²       ECOG PERFORMANCE STATUS - 0-Fully active, able to carry on all pre-disease performance without restriction. Pain - 0 - No pain/10. None/Minimal pain - not affecting QOL     Fatigue - No flowsheet data found. Distress - No flowsheet data found. Physical Exam  Vitals reviewed. Exam conducted with a chaperone present. Constitutional:       General: She is not in acute distress. Appearance: Normal appearance. She is not ill-appearing or toxic-appearing. HENT:      Head: Normocephalic and atraumatic. Nose: Nose normal.      Mouth/Throat:      Mouth: Mucous membranes are moist.   Eyes:      General: No scleral icterus. Extraocular Movements: Extraocular movements intact. Conjunctiva/sclera: Conjunctivae normal.      Pupils: Pupils are equal, round, and reactive to light. Cardiovascular:      Rate and Rhythm: Normal rate and regular rhythm. Heart sounds: No murmur heard. Pulmonary:      Effort: Pulmonary effort is normal. No respiratory distress. Breath sounds: Normal breath sounds. No wheezing or rales. Chest:   Breasts:      Right: No axillary adenopathy or supraclavicular adenopathy. Left: No axillary adenopathy or supraclavicular adenopathy. Comments: 1.5x2cm hard lesion in the left axillary tail   No other mass/lump bilaterally   No axillary LAD   Scar over left breast from previous resection   Abdominal:      General: There is no distension. Musculoskeletal:         General: Normal range of motion. Cervical back: Normal range of motion. Right lower leg: No edema. Left lower leg: No edema. Lymphadenopathy:      Cervical: No cervical adenopathy. Upper Body:      Right upper body: No supraclavicular or axillary adenopathy. Left upper body: No supraclavicular or axillary adenopathy.    Skin: General: Skin is warm and dry. Coloration: Skin is not jaundiced or pale. Findings: No rash. Neurological:      General: No focal deficit present. Mental Status: She is alert and oriented to person, place, and time. Gait: Gait normal.   Psychiatric:         Behavior: Behavior normal.         Thought Content: Thought content normal.          Labs:  No results found for this or any previous visit (from the past 168 hour(s)). Imaging: reviewed     PATHOLOGY:     Reviewed per above     ASSESSMENT:     Diagnosis Orders   1. Abnormal mammogram  MRI BREAST BILATERAL W WO CONTRAST   2. Mass of axillary tail of left breast  MRI BREAST BILATERAL W WO CONTRAST    [address], [place], , General Surgery, [facility]       Ms. Jodi Espinoza is here for evaluation of left breast mass. 1. Left axillary tail breat mass - ~2cm on examination     - during today's consultation, we discussed her recent imaging and pathology in detail. Pt feels the mass has grown since bx. She has a hx of left breast excision - years ago - benign findings. 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 She stated that this required 3 surgeries to obtain clear margins. No LN sampling at that time. She is ok with p/w MRI breast at this time and I will also refer her for sx consultation w Dr Dilshad Boykin. We discussed need for insurance pre-authorization for  No B symptoms. Recent cbc normal.    - new dermal lesion over her right medial breast - somewhat violacoious in color - I think this should be considered for bx, considering her hx. Appears different from her other cherry angiomas.   She agrees with the plan and will call her dermatologist.     - refer to surgery - Dr Dilshad Boykin; will plan on tumor board discussion     RESUSCITATION DIRECTIVES/HOSPICE CARE: Full Support    RTC after workup or sooner as needed     MDM  Number of Diagnoses or Management Options  Abnormal mammogram: new, needed workup  Mass of axillary tail of left breast: new, needed workup     Amount and/or Complexity of Data Reviewed  Clinical lab tests: reviewed  Tests in the radiology section of CPT®: ordered and reviewed  Review and summarize past medical records: yes  Independent visualization of images, tracings, or specimens: yes    Risk of Complications, Morbidity, and/or Mortality  Presenting problems: moderate  Diagnostic procedures: low  Management options: moderate        Lab studies and imaging studies were personally reviewed. Pertinent old records were reviewed. All questions were asked and answered to the best of my ability. The patient verbalized understanding and agrees with the plan above. Documentation was sent to MELLISA Presley for continuation of care. Thank you, MELLISA Presley, for the courtesy of this referral.        [facility]) Barb Salvador, [facility] Hematology and Oncology  [address] [place] [address]  Office : [phone]  Fax : [phone]

## 2022-06-27 ENCOUNTER — OFFICE VISIT (OUTPATIENT)
Dept: ONCOLOGY | Age: 62
End: 2022-06-27
Payer: COMMERCIAL

## 2022-06-27 VITALS
HEIGHT: 68 IN | RESPIRATION RATE: 18 BRPM | TEMPERATURE: 98 F | HEART RATE: 90 BPM | WEIGHT: 271 LBS | SYSTOLIC BLOOD PRESSURE: 130 MMHG | OXYGEN SATURATION: 96 % | DIASTOLIC BLOOD PRESSURE: 79 MMHG | BODY MASS INDEX: 41.07 KG/M2

## 2022-06-27 DIAGNOSIS — N63.32 MASS OF AXILLARY TAIL OF LEFT BREAST: Primary | ICD-10-CM

## 2022-06-27 DIAGNOSIS — Z85.831 HISTORY OF SARCOMA: ICD-10-CM

## 2022-06-27 DIAGNOSIS — R92.8 ABNORMAL MAMMOGRAM: ICD-10-CM

## 2022-06-27 PROCEDURE — 99204 OFFICE O/P NEW MOD 45 MIN: CPT | Performed by: INTERNAL MEDICINE

## 2022-06-27 ASSESSMENT — ENCOUNTER SYMPTOMS
BLOOD IN STOOL: 0
SHORTNESS OF BREATH: 0
NAUSEA: 0
DIARRHEA: 0
VOMITING: 0
WHEEZING: 0
SORE THROAT: 0
VOICE CHANGE: 0
TROUBLE SWALLOWING: 0
SCLERAL ICTERUS: 0
CONSTIPATION: 0
ABDOMINAL DISTENTION: 0
ABDOMINAL PAIN: 0
HEMOPTYSIS: 0
CHEST TIGHTNESS: 0

## 2022-06-27 NOTE — PATIENT INSTRUCTIONS
Patient Instructions from Today's Visit    Reason for Visit:  New patient for axillary lump. Plan: Will proceed with breast MRI. Recent imaging and pathology reviewed. Referral to surgeon to discuss removal of left armpit lesion. Follow up asap with dermatologist.      Follow Up: After MRI and tumor board discussion. Recent Lab Results:  n/a    Treatment Summary has been discussed and given to patient: n/a        -------------------------------------------------------------------------------------------------------------------  Please call our office at [phone] if you have any  of the following symptoms:   · Fever of 100.5 or greater  · Chills  · Shortness of breath  · Swelling or pain in one leg    After office hours an answering service is available and will contact a provider for emergencies or if you are experiencing any of the above symptoms.  Patient did express an interest in My Chart. My Chart log in information explained on the after visit summary printout at the [address] desk.     Suresh Rojas RN

## 2022-07-01 ENCOUNTER — HOSPITAL ENCOUNTER (OUTPATIENT)
Dept: MRI IMAGING | Age: 62
Discharge: HOME | End: 2022-07-04
Payer: COMMERCIAL

## 2022-07-01 DIAGNOSIS — N63.32 MASS OF AXILLARY TAIL OF LEFT BREAST: ICD-10-CM

## 2022-07-01 DIAGNOSIS — R92.8 ABNORMAL MAMMOGRAM: ICD-10-CM

## 2022-07-01 PROCEDURE — 6360000004 HC RX CONTRAST MEDICATION: Performed by: INTERNAL MEDICINE

## 2022-07-01 PROCEDURE — 2580000003 HC RX 258: Performed by: INTERNAL MEDICINE

## 2022-07-01 PROCEDURE — A9579 GAD-BASE MR CONTRAST NOS,1ML: HCPCS | Performed by: INTERNAL MEDICINE

## 2022-07-01 PROCEDURE — C8908 MRI W/O FOL W/CONT, BREAST,: HCPCS

## 2022-07-01 RX ORDER — SODIUM CHLORIDE 0.9 % (FLUSH) 0.9 %
10 SYRINGE (ML) INJECTION EVERY 8 HOURS
Status: DISCONTINUED | OUTPATIENT
Start: 2022-07-01 | End: 2022-07-05 | Stop reason: HOSPADM

## 2022-07-01 RX ORDER — SODIUM CHLORIDE 0.9 % (FLUSH) 0.9 %
40 SYRINGE (ML) INJECTION AS NEEDED
Status: DISCONTINUED | OUTPATIENT
Start: 2022-07-01 | End: 2022-07-05 | Stop reason: HOSPADM

## 2022-07-01 RX ADMIN — SODIUM CHLORIDE, PRESERVATIVE FREE 40 ML: 5 INJECTION INTRAVENOUS at 08:29

## 2022-07-01 RX ADMIN — SODIUM CHLORIDE, PRESERVATIVE FREE 10 ML: 5 INJECTION INTRAVENOUS at 08:30

## 2022-07-01 RX ADMIN — GADOTERIDOL 25 ML: 279.3 INJECTION, SOLUTION INTRAVENOUS at 08:29

## 2022-07-05 ENCOUNTER — APPOINTMENT (RX ONLY)
Dept: URBAN - METROPOLITAN AREA CLINIC 23 | Facility: CLINIC | Age: 62
Setting detail: DERMATOLOGY
End: 2022-07-05

## 2022-07-05 DIAGNOSIS — D485 NEOPLASM OF UNCERTAIN BEHAVIOR OF SKIN: ICD-10-CM

## 2022-07-05 DIAGNOSIS — L82.1 OTHER SEBORRHEIC KERATOSIS: ICD-10-CM

## 2022-07-05 DIAGNOSIS — D22 MELANOCYTIC NEVI: ICD-10-CM

## 2022-07-05 DIAGNOSIS — D18.0 HEMANGIOMA: ICD-10-CM

## 2022-07-05 DIAGNOSIS — L30.9 DERMATITIS, UNSPECIFIED: ICD-10-CM

## 2022-07-05 PROBLEM — D22.39 MELANOCYTIC NEVI OF OTHER PARTS OF FACE: Status: ACTIVE | Noted: 2022-07-05

## 2022-07-05 PROBLEM — D18.01 HEMANGIOMA OF SKIN AND SUBCUTANEOUS TISSUE: Status: ACTIVE | Noted: 2022-07-05

## 2022-07-05 PROBLEM — D48.5 NEOPLASM OF UNCERTAIN BEHAVIOR OF SKIN: Status: ACTIVE | Noted: 2022-07-05

## 2022-07-05 PROCEDURE — 11102 TANGNTL BX SKIN SINGLE LES: CPT

## 2022-07-05 PROCEDURE — 99213 OFFICE O/P EST LOW 20 MIN: CPT | Mod: 25

## 2022-07-05 PROCEDURE — ? COUNSELING

## 2022-07-05 PROCEDURE — ? BIOPSY BY SHAVE METHOD

## 2022-07-05 PROCEDURE — ? TREATMENT REGIMEN

## 2022-07-05 ASSESSMENT — LOCATION ZONE DERM
LOCATION ZONE: TRUNK
LOCATION ZONE: FACE

## 2022-07-05 ASSESSMENT — LOCATION SIMPLE DESCRIPTION DERM
LOCATION SIMPLE: LEFT BREAST
LOCATION SIMPLE: RIGHT BREAST
LOCATION SIMPLE: LEFT CHEEK
LOCATION SIMPLE: CHEST

## 2022-07-05 ASSESSMENT — LOCATION DETAILED DESCRIPTION DERM
LOCATION DETAILED: LEFT MEDIAL BREAST 10-11:00 REGION
LOCATION DETAILED: LEFT LATERAL BREAST 12-1:00 REGION
LOCATION DETAILED: LEFT LATERAL MANDIBULAR CHEEK
LOCATION DETAILED: LEFT CENTRAL MANDIBULAR CHEEK
LOCATION DETAILED: RIGHT MEDIAL BREAST 1-2:00 REGION
LOCATION DETAILED: RIGHT MEDIAL SUPERIOR CHEST

## 2022-07-05 NOTE — PROCEDURE: TREATMENT REGIMEN
Initiate Treatment: Cortisone Cream otc.  Return if this new lesion does not resolve
Detail Level: Zone

## 2022-07-05 NOTE — PROCEDURE: BIOPSY BY SHAVE METHOD
Validate That Anesthesia Is Not 0: No
Accession #: [id number]
Cryotherapy Text: The wound bed was treated with cryotherapy after the biopsy was performed.
Biopsy Type: H and E
Information: Selecting Yes will display possible errors in your note based on the variables you have selected. This validation is only offered as a suggestion for you. PLEASE NOTE THAT THE VALIDATION TEXT WILL BE REMOVED WHEN YOU FINALIZE YOUR NOTE. IF YOU WANT TO FAX A PRELIMINARY NOTE YOU WILL NEED TO TOGGLE THIS TO 'NO' IF YOU DO NOT WANT IT IN YOUR FAXED NOTE.
X Size Of Lesion In Cm: 0
Hemostasis: Aluminum Chloride
Depth Of Biopsy: dermis
Consent: Written consent was obtained and risks were reviewed including but not limited to scarring, infection, bleeding, scabbing, incomplete removal, nerve damage and allergy to anesthesia.
Silver Nitrate Text: The wound bed was treated with silver nitrate after the biopsy was performed.
Notification Instructions: Patient will be notified of biopsy results. However, patient instructed to call the office if not contacted within 2 weeks.
Electrodesiccation And Curettage Text: The wound bed was treated with electrodesiccation and curettage after the biopsy was performed.
Post-Care Instructions: I reviewed with the patient in detail post-care instructions. Patient is to keep the biopsy site dry overnight, and then apply bacitracin twice daily until healed. Patient may apply hydrogen peroxide soaks to remove any crusting.
Billing Type: Third-Party Bill
Anesthesia Volume In Cc: 0.5
Curettage Text: The wound bed was treated with curettage after the biopsy was performed.
Dressing: bandage
Detail Level: Detailed
Was A Bandage Applied: Yes
Biopsy Method: double edge Personna blade
Wound Care: Petrolatum
Electrodesiccation Text: The wound bed was treated with electrodesiccation after the biopsy was performed.
Type Of Destruction Used: Curettage
Anesthesia Type: 1% lidocaine without epinephrine and a 1:10 solution of 8.4% sodium bicarbonate

## 2022-07-05 NOTE — HPI: SKIN LESION
What Type Of Note Output Would You Prefer (Optional)?: Standard Output
How Severe Is Your Skin Lesion?: mild
Has Your Skin Lesion Been Treated?: not been treated
Is This A New Presentation, Or A Follow-Up?: Skin Lesion
Additional History: Currently undergoing evaluation for breast cancer.  Biopsy negative though she has MRI Friday.

## 2022-07-06 ENCOUNTER — TELEPHONE (OUTPATIENT)
Dept: ONCOLOGY | Age: 62
End: 2022-07-06

## 2022-07-06 NOTE — TELEPHONE ENCOUNTER
Pt called requesting to see her MRI results posted into her mychart. Stated she is unable to see them.

## 2022-07-07 ASSESSMENT — ENCOUNTER SYMPTOMS
TROUBLE SWALLOWING: 0
HEMOPTYSIS: 0
SHORTNESS OF BREATH: 0
VOICE CHANGE: 0
ABDOMINAL DISTENTION: 0
SCLERAL ICTERUS: 0
WHEEZING: 0
CHEST TIGHTNESS: 0
BLOOD IN STOOL: 0
ABDOMINAL PAIN: 0
CONSTIPATION: 0
SORE THROAT: 0
DIARRHEA: 0
VOMITING: 0
NAUSEA: 0

## 2022-07-07 NOTE — PROGRESS NOTES
[facility] Hematology and Oncology: Established patient - follow up     Chief Complaint   Patient presents with    Follow-up     Reason for Referral: Abnormal breast biopsy; Mass of axillary tail of left breast; History of skin cancer; Family history of breast cancer in mother  Referring Provider: SHIRLEY Rutherford CNP  Family History of Cancer/Hematologic Disorders: Family history is significant for mother with breast cancer. Presenting Symptoms: Lump in left breast axillary tail    History of Present Illness:  Ms. Sabina Heck is a 63 y.o. female who presents today for follow up regarding recent mass in the axillary breast tail. 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  On 4/8/22 she presented to her PCPs office with complaint of left breast lump. Physical exam confirmed a firm and non-tender left breast mass measuring approximately 1 cm in the left upper outer breast region along the tail of breast tissue. Further evaluation with bilateral diagnostic digital mammogram with tomosynthesis and left breast ultrasound on 4/8/22 identified a 1.1 cm hypoechoic lesion in the left axillary tail that initially appeared inflammatory, possibly a small abscess. Treatment with antibiotics and follow-up ultrasound in 3-4 weeks to document resolution was recommended.  Patient was treated with antibiotics and repeat left breast ultrasound was performed on 5/6/22.   US showed a persistent masslike area in the left axillary tail which was still thought to most likely be inflammatory but was unimproved with antibiotic treatment, and ultrasound guided biopsy/aspiration was recommended to exclude an atypical tumor or infection.  Recommended ultrasound-guided biopsy of the left axillary tail mass was completed on 5/13/22 with pathology revealing tissue consistent with needle biopsy of lymph node having foamy histiocytes in association with acute inflammation, nondiagnostic for metastatic tumor. Pathologist noted, While metastatic malignant tumor is not identified in this material, unsampled lymph node could have such findings. While intact architecture is most consistent with reactive lymph node, focal involvement of lymph node by low grade lymphoma cannot be ruled out on H&E morphology alone.   Patient was consequently recommended 6-month follow-up. She returned to her PCP on 6/17/22 to discuss her breast biopsy results. She reported being recommended 6-month follow-up, but she was concerned with her history of cancer and requested to be evaluated sooner. PCP noted that patient believes the breast lump is getting larger.  Referral was placed to [facility] for Medical Oncology evaluation and treatment recommendations. Daughter lives in [place] - has ET and sees heme/onc at [facility]. At consultation, we discussed her recent imaging and pathology in detail. Pt feels the mass has grown some since bx. She has a hx of left breast excision - years ago - benign findings. 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 She stated that this required 3 surgeries to obtain clear margins. No LN sampling at that time. Today, she is here for FU after MR. She is here with her . We reviewed the images from her MRI. Plan is to p/w surgery and resection of the lesion. Path from 5/13 bx c/w lymph node having foamy histiocytes associated with acute inflammation - non-malignant. In the interim, pt also underwent bx of skin lesion and path is somewhat inconclusive 5/16: Neoplasm of uncertain behavior versus BCC versus irritated nevus. I have discussed pt's case at tumor board and recommend excision with good margins. Will plan to send path for outside review. No b symptoms.   Pt ok with me reviewing case with Dr Soledad Berg at [facility] once we have path if sarcoma.       Chronological Events:   BILATERAL DIAGNOSTIC DIGITAL MAMMOGRAM WITH TOMOSYNTHESIS AND LEFT BREAST ULTRASOUND 4/8/22  FINDINGS: There is an area of ill-defined increased density in the left axillary tail which corresponds to the palpable abnormality. This is superior to prior lumpectomy site.  No other masslike areas are seen in either breast.    LEFT BREAST ULTRASOUND: There is a 1.1 cm hypoechoic lesion in the left axillary tail. The surrounding tissue is echogenic.  No other masslike areas are seen.   IMPRESSION: Left axilla tail lesion appears inflammatory, possibly a small abscess.  Suggest treatment with antibiotics.  Follow-up ultrasound in 3-4 weeks to document resolution.   BI-RADS Assessment Category 3: Probably benign- Short-interval follow-up  suggested. (#BRad3)     LEFT BREAST ULTRASOUND 5/6/22  FINDINGS: Ultrasound of the upper outer left breast shows a persistent hyperechoic area with central hypoechoic tissue, possibly fluid.  The central hypoechoic area measures 1.1 cm.  The larger hyperechoic region measures 3.6 x 1.6 cm.  There is no significant change in appearance compared with the prior  ultrasound from 04/08/2022. IMPRESSION: Persistent masslike area in the left axillary tail.  Most likely inflammatory, but it has not improved with antibiotic treatment.  Ultrasound guided biopsy/aspiration is recommended to exclude an atypical tumor or infection.   BI-RADS Assessment Category 4: Suspicious Finding- Biopsy should be considered.  (#BRad4)     [facility] SURGICAL PATHOLOGY REPORT 5/13/22 6/27/22 heme/onc consultation   7/1/22 MRI - SIGNIFICANT INTERVAL INCREASE IN SIZE OF THE PALPABLE LEFT AXILLARY TAIL MASS WITH DERMAL INVOLVEMENT SINCE BIOPSY ON MAY 13 MUST BE CONSIDERED SUSPICIOUS FOR MALIGNANCY UNTIL PROVEN OTHERWISE, AND THE POSSIBILITY OF  DERMATOFIBROSARCOMA OR OTHER ATYPICAL NEOPLASM IS SUGGESTED IN THIS 65-YEAR-OLD  STATUS POST REMOTE SURGICAL EXCISION OF A LEFT UPPER ABDOMINAL DERMATOFIBROSARCOMA. FOLLOW-UP SURGICAL CONSULTATION IS RECOMMENDED FOR CONSIDERATION OF EXCISIONAL BIOPSY. 2.  A 7 MM FOCUS OF RIGHT POSTERIOR 2:30 SUSPICIOUS DERMAL ENHANCEMENT IS NONSPECIFIC BUT COULD REPRESENT AN ADDITIONAL SKIN NEOPLASM. CORRELATION WITH PHYSICAL EXAMINATION AND  CONSIDERATION OF PUNCH BIOPSY ARE RECOMMENDED.  7/8/22 FU after imaging and tumor board - plan is to pw sx; MR results reviewed with pt/; PET       Family History   Problem Relation Age of Onset    Cancer Mother         breast      Social History     Socioeconomic History    Marital status:      Spouse name: None    Number of children: None    Years of education: None    Highest education level: None   Occupational History    None   Tobacco Use    Smoking status: Never Smoker    Smokeless tobacco: Never Used   Substance and Sexual Activity    Alcohol use: Yes    Drug use: No    Sexual activity: None   Other Topics Concern    None   Social History Narrative    None     Social Determinants of Health     Financial Resource Strain:     Difficulty of Paying Living Expenses: Not on file   Food Insecurity:     Worried About Running Out of Food in the Last Year: Not on file    Uriel of Food in the Last Year: Not on file   Transportation Needs:     Lack of Transportation (Medical): Not on file    Lack of Transportation (Non-Medical):  Not on file   Physical Activity:     Days of Exercise per Week: Not on file    Minutes of Exercise per Session: Not on file   Stress:     Feeling of Stress : Not on file   Social Connections:     Frequency of Communication with Friends and Family: Not on file    Frequency of Social Gatherings with Friends and Family: Not on file    Attends Buddhist Services: Not on file    Active Member of Clubs or Organizations: Not on file    Attends Club or Organization Meetings: Not on file    Marital Status: Not on file   Intimate Partner Violence:     Fear of Current or Ex-Partner: Not on file    Emotionally Abused: Not on file    Physically Abused: Not on file    Sexually Abused: Not on file   Housing Stability:     Unable to Pay for Housing in the Last Year: Not on file    Number of [place] in the Last Year: Not on file    Unstable Housing in the Last Year: Not on file        Review of Systems   Constitutional: Negative for appetite change, chills, diaphoresis, fatigue, fever and unexpected weight change. HENT:   Negative for hearing loss, mouth sores, nosebleeds, sore throat, trouble swallowing and voice change. Eyes: Negative for icterus. Respiratory: Negative for chest tightness, hemoptysis, shortness of breath and wheezing. Cardiovascular: Negative for chest pain, leg swelling and palpitations. Gastrointestinal: Negative for abdominal distention, abdominal pain, blood in stool, constipation, diarrhea, nausea and vomiting. Endocrine: Negative for hot flashes. Genitourinary: Negative for difficulty urinating, frequency, vaginal bleeding and vaginal discharge. Musculoskeletal: Negative for arthralgias, flank pain, gait problem and myalgias. Skin: Negative for itching, rash and wound. Neurological: Negative for dizziness, extremity weakness, gait problem, headaches and numbness. Psychiatric/Behavioral: Negative for confusion and depression. The patient is nervous/anxious.          No Known Allergies  Past Medical History:   Diagnosis Date    Adenomatous polyp of colon 03/17/2017    Cancer ([facility] [address] 1993    skin; Dermatofibro sarcoma in the left upper abdomen    Diabetes ([facility] [address]     Fundic gland polyposis of stomach 03/17/2017    GERD (gastroesophageal reflux disease)     Hypertension      Past Surgical History:   Procedure Laterality Date    HEENT      wisdom teeth    ORTHOPEDIC SURGERY Right 2005    OTHER SURGICAL HISTORY  1994    cancer removal    US BREAST NEEDLE BIOPSY LEFT Left 5/13/2022     BREAST NEEDLE BIOPSY LEFT 5/13/2022 [facility]IOLOGY MAMMO     Current Outpatient Medications   Medication Sig Dispense Refill    aspirin 81 MG EC tablet Take 81 mg by mouth daily      atorvastatin (LIPITOR) 40 MG tablet Take 40 mg by mouth daily      lisinopril-hydroCHLOROthiazide (PRINZIDE;ZESTORETIC) 20-25 MG per tablet Take 0.5 tablets by mouth daily      metFORMIN (GLUCOPHAGE) 1000 MG tablet TAKE 1 TABLET BY MOUTH TWICE A DAY WITH MEALS      omeprazole (PRILOSEC) 20 MG delayed release capsule Take 20 mg by mouth daily      cephALEXin (KEFLEX) 500 MG capsule Take 500 mg by mouth 2 times daily (Patient not taking: Reported on 6/27/2022)      diclofenac sodium (VOLTAREN) 1 % GEL Apply 4 g topically 4 times daily as needed (Patient not taking: Reported on 6/27/2022)       No current facility-administered medications for this visit. No flowsheet data found. OBJECTIVE:  BP (!) 139/92 Comment: standing  Pulse 90   Temp 97.4 °F (36.3 °C)   Resp 18   Ht 5' 8\" (1.727 m)   Wt 271 lb (122.9 kg)   SpO2 96%   BMI 41.21 kg/m²   Pt anxious     ECOG PERFORMANCE STATUS - 0-Fully active, able to carry on all pre-disease performance without restriction. Pain - /10. None/Minimal pain - not affecting QOL     Fatigue - No flowsheet data found. Distress - No flowsheet data found. Physical Exam  Vitals reviewed. Exam conducted with a chaperone present. Constitutional:       General: She is not in acute distress. Appearance: Normal appearance. She is not ill-appearing or toxic-appearing. HENT:      Head: Normocephalic and atraumatic. Nose: Nose normal.      Mouth/Throat:      Mouth: Mucous membranes are moist.   Eyes:      General: No scleral icterus. Extraocular Movements: Extraocular movements intact. Conjunctiva/sclera: Conjunctivae normal.      Pupils: Pupils are equal, round, and reactive to light.    Cardiovascular:      Rate and Rhythm: Normal rate and regular rhythm. Heart sounds: No murmur heard. Pulmonary:      Effort: Pulmonary effort is normal. No respiratory distress. Breath sounds: Normal breath sounds. No wheezing or rales. Chest:   Breasts:      Right: No axillary adenopathy or supraclavicular adenopathy. Left: No axillary adenopathy or supraclavicular adenopathy. Comments: ~2cm hard lesion in the left axillary tail   No other mass/lump bilaterally   No axillary LAD   Scar over left breast from previous resection   bandaid over right breast after recent shave bx by derm   Abdominal:      General: There is no distension. Musculoskeletal:         General: Normal range of motion. Cervical back: Normal range of motion. Right lower leg: No edema. Left lower leg: No edema. Lymphadenopathy:      Cervical: No cervical adenopathy. Upper Body:      Right upper body: No supraclavicular or axillary adenopathy. Left upper body: No supraclavicular or axillary adenopathy. Skin:     General: Skin is warm and dry. Coloration: Skin is not jaundiced or pale. Findings: No rash. Neurological:      General: No focal deficit present. Mental Status: She is alert and oriented to person, place, and time. Gait: Gait normal.   Psychiatric:         Behavior: Behavior normal.         Thought Content: Thought content normal.          Labs:  No results found for this or any previous visit (from the past 168 hour(s)). Imaging: reviewed     PATHOLOGY:         ASSESSMENT:     Diagnosis Orders   1. Mass of axillary tail of left breast  PET CT SKULL BASE TO MID THIGH   2. History of solitary pulmonary nodule  PET CT SKULL BASE TO MID THIGH   3. Abnormal MRI, breast  PET CT SKULL BASE TO MID THIGH       Ms. Karina Villalobos is here for evaluation of left breast mass. 1. Left axillary tail breat mass - ~2cm on examination     - we discussed her recent imaging and pathology in detail.   Pt feels the mass has grown since bx. She has a hx of left breast excision - years ago - benign findings. 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 She stated that this required 3 surgeries to obtain clear margins. No LN sampling at that time. - s/p MRI - images reviewed and growth noted; case d/w tumor board - recommend excision of breast tail lesion with generous margins and also resection of biopsied right breast area; I have discussed pt's case with Dr Dillon Joyce. - will get PET as soon as possible - will likely have results before sx date    - pt ok with us sending final path for outside review to academic center; also ok with me reviewing her case with [facility] expert once we have pathology    RESUSCITATION DIRECTIVES/HOSPICE CARE: Full Support    RTC after sx or sooner as needed   [40min - chart review, visit, discussion, review of images, coordination of care, discussion with other providers and charting]    MDM  Number of Diagnoses or Management Options  Abnormal MRI, breast: established, worsening  Mass of axillary tail of left breast: established, worsening     Amount and/or Complexity of Data Reviewed  Clinical lab tests: ordered and reviewed  Tests in the radiology section of CPT®: ordered and reviewed  Obtain history from someone other than the patient: yes  Review and summarize past medical records: yes  Discuss the patient with other providers: yes  Independent visualization of images, tracings, or specimens: yes    Risk of Complications, Morbidity, and/or Mortality  Presenting problems: moderate  Diagnostic procedures: moderate  Management options: high        Lab studies and imaging studies were personally reviewed. Pertinent old records were reviewed. Historical:   - new dermal lesion over her right medial breast - somewhat violacoious in color - I think this should be considered for bx, considering her hx. Appears different from her other cherry angiomas.   She agrees with the plan and will call her dermatologist.     - refer to surgery - Dr Nydia Whiteside; will plan on tumor board discussion     All questions were asked and answered to the best of my ability. The patient verbalized understanding and agrees with the plan above.             [facility], [address] [place] [address] Hematology and Oncology  [address] [place] [address]  Office : [phone]  Fax : [phone]

## 2022-07-08 ENCOUNTER — OFFICE VISIT (OUTPATIENT)
Dept: ONCOLOGY | Age: 62
End: 2022-07-08
Payer: COMMERCIAL

## 2022-07-08 VITALS
HEIGHT: 68 IN | OXYGEN SATURATION: 96 % | BODY MASS INDEX: 41.07 KG/M2 | RESPIRATION RATE: 18 BRPM | SYSTOLIC BLOOD PRESSURE: 139 MMHG | WEIGHT: 271 LBS | HEART RATE: 90 BPM | TEMPERATURE: 97.4 F | DIASTOLIC BLOOD PRESSURE: 92 MMHG

## 2022-07-08 DIAGNOSIS — Z87.898 HISTORY OF SOLITARY PULMONARY NODULE: ICD-10-CM

## 2022-07-08 DIAGNOSIS — N63.32 MASS OF AXILLARY TAIL OF LEFT BREAST: Primary | ICD-10-CM

## 2022-07-08 DIAGNOSIS — Z85.831 PERSONAL HISTORY OF SARCOMA OF SOFT TISSUE: ICD-10-CM

## 2022-07-08 DIAGNOSIS — R92.8 ABNORMAL MRI, BREAST: ICD-10-CM

## 2022-07-08 PROCEDURE — 99215 OFFICE O/P EST HI 40 MIN: CPT | Performed by: INTERNAL MEDICINE

## 2022-07-08 ASSESSMENT — PATIENT HEALTH QUESTIONNAIRE - PHQ9
10. IF YOU CHECKED OFF ANY PROBLEMS, HOW DIFFICULT HAVE THESE PROBLEMS MADE IT FOR YOU TO DO YOUR WORK, TAKE CARE OF THINGS AT HOME, OR GET ALONG WITH OTHER PEOPLE: 0
SUM OF ALL RESPONSES TO PHQ QUESTIONS 1-9: 0
5. POOR APPETITE OR OVEREATING: 0
SUM OF ALL RESPONSES TO PHQ QUESTIONS 1-9: 0
9. THOUGHTS THAT YOU WOULD BE BETTER OFF DEAD, OR OF HURTING YOURSELF: 0
SUM OF ALL RESPONSES TO PHQ QUESTIONS 1-9: 0
1. LITTLE INTEREST OR PLEASURE IN DOING THINGS: 0
3. TROUBLE FALLING OR STAYING ASLEEP: 0
7. TROUBLE CONCENTRATING ON THINGS, SUCH AS READING THE NEWSPAPER OR WATCHING TELEVISION: 0
2. FEELING DOWN, DEPRESSED OR HOPELESS: 0
8. MOVING OR SPEAKING SO SLOWLY THAT OTHER PEOPLE COULD HAVE NOTICED. OR THE OPPOSITE, BEING SO FIGETY OR RESTLESS THAT YOU HAVE BEEN MOVING AROUND A LOT MORE THAN USUAL: 0
6. FEELING BAD ABOUT YOURSELF - OR THAT YOU ARE A FAILURE OR HAVE LET YOURSELF OR YOUR FAMILY DOWN: 0
SUM OF ALL RESPONSES TO PHQ9 QUESTIONS 1 & 2: 0
SUM OF ALL RESPONSES TO PHQ QUESTIONS 1-9: 0
4. FEELING TIRED OR HAVING LITTLE ENERGY: 0

## 2022-07-08 NOTE — PATIENT INSTRUCTIONS
Patient Instructions from Today's Visit    Reason for Visit:  Follow up. Plan:  Discussed tumor boards recs for surgery. Proceed with PET scan. Follow Up:  Virtual visit after PET scan. Recent Lab Results:  n/a      Treatment Summary has been discussed and given to patient: n/a        -------------------------------------------------------------------------------------------------------------------  Please call our office at [phone] if you have any  of the following symptoms:   · Fever of 100.5 or greater  · Chills  · Shortness of breath  · Swelling or pain in one leg    After office hours an answering service is available and will contact a provider for emergencies or if you are experiencing any of the above symptoms.  Patient did express an interest in My Chart. My Chart log in information explained on the after visit summary printout at the [address] desk.     Elliot Ludwig RN

## 2022-07-12 PROBLEM — R92.8 ABNORMAL MRI, BREAST: Status: ACTIVE | Noted: 2022-07-12

## 2022-07-12 PROBLEM — Z85.831 PERSONAL HISTORY OF SARCOMA OF SOFT TISSUE: Status: ACTIVE | Noted: 2022-07-12

## 2022-07-14 ENCOUNTER — OFFICE VISIT (OUTPATIENT)
Dept: SURGERY | Age: 62
End: 2022-07-14
Payer: COMMERCIAL

## 2022-07-14 ENCOUNTER — PREP FOR PROCEDURE (OUTPATIENT)
Dept: SURGERY | Age: 62
End: 2022-07-14

## 2022-07-14 VITALS
WEIGHT: 271 LBS | HEART RATE: 102 BPM | SYSTOLIC BLOOD PRESSURE: 130 MMHG | OXYGEN SATURATION: 97 % | DIASTOLIC BLOOD PRESSURE: 88 MMHG | BODY MASS INDEX: 41.07 KG/M2 | HEIGHT: 68 IN

## 2022-07-14 DIAGNOSIS — N63.32 MASS OF AXILLARY TAIL OF LEFT BREAST: Primary | ICD-10-CM

## 2022-07-14 PROCEDURE — 99204 OFFICE O/P NEW MOD 45 MIN: CPT | Performed by: STUDENT IN AN ORGANIZED HEALTH CARE EDUCATION/TRAINING PROGRAM

## 2022-07-14 RX ORDER — SODIUM CHLORIDE 0.9 % (FLUSH) 0.9 %
5-40 SYRINGE (ML) INJECTION EVERY 12 HOURS SCHEDULED
Status: CANCELLED | OUTPATIENT
Start: 2022-07-14

## 2022-07-14 RX ORDER — SODIUM CHLORIDE 0.9 % (FLUSH) 0.9 %
5-40 SYRINGE (ML) INJECTION PRN
Status: CANCELLED | OUTPATIENT
Start: 2022-07-14

## 2022-07-14 RX ORDER — SODIUM CHLORIDE 9 MG/ML
INJECTION, SOLUTION INTRAVENOUS PRN
Status: CANCELLED | OUTPATIENT
Start: 2022-07-14

## 2022-07-14 NOTE — PROGRESS NOTES
General Surgery New Patient Office Note:    7/14/2022    Maria De Jesus Bui  MRN: [id number]      CHIEF COMPLAINT: Mass of the left axillary tail    PRIMARY CARE PHYSICIAN: SHIRLEY Ayala - CNP    HISTORY: Patient presents with a palpable mass of the left axillary tail. She has a history of dermatofibrosarcoma of the left upper abdomen. Patient states she first noticed this area in April. She underwent imaging which noted that it looked infectious or inflammatory in nature and was placed on antibiotics. She states that it continued to get larger in size. She went underwent a biopsy which is showed reactive lymphatics and no definitive evidence of cancer. It has continued to grow in size obviously concerning for possible malignancy and discordant biopsy results. She also has an area of her right chest wall that was a mall and was shaved off by dermatology. We are unsure of what the pathology shows of this area as well.     REVIEW OF SYSTEMS: 10 Point ROS negative except what is in HPI      Past Medical History:   Diagnosis Date    Adenomatous polyp of colon 03/17/2017    Cancer ([facility] [address] 1993    skin; Dermatofibro sarcoma in the left upper abdomen    Diabetes ([facility] [address]     Fundic gland polyposis of stomach 03/17/2017    GERD (gastroesophageal reflux disease)     Hypertension        Current Outpatient Medications   Medication Sig Dispense Refill    aspirin 81 MG EC tablet Take 81 mg by mouth daily      atorvastatin (LIPITOR) 40 MG tablet Take 40 mg by mouth daily      cephALEXin (KEFLEX) 500 MG capsule Take 500 mg by mouth 2 times daily       diclofenac sodium (VOLTAREN) 1 % GEL Apply 4 g topically 4 times daily as needed       lisinopril-hydroCHLOROthiazide (PRINZIDE;ZESTORETIC) 20-25 MG per tablet Take 0.5 tablets by mouth daily      metFORMIN (GLUCOPHAGE) 1000 MG tablet TAKE 1 TABLET BY MOUTH TWICE A DAY WITH MEALS      omeprazole (PRILOSEC) 20 MG delayed release capsule Take 20 mg by mouth daily No current facility-administered medications for this visit. Family History   Problem Relation Age of Onset    Cancer Mother         breast       Social History     Socioeconomic History    Marital status:      Spouse name: None    Number of children: None    Years of education: None    Highest education level: None   Occupational History    None   Tobacco Use    Smoking status: Never Smoker    Smokeless tobacco: Never Used   Substance and Sexual Activity    Alcohol use: Yes    Drug use: No    Sexual activity: None   Other Topics Concern    None   Social History Narrative    None     Social Determinants of Health     Financial Resource Strain:     Difficulty of Paying Living Expenses: Not on file   Food Insecurity:     Worried About Running Out of Food in the Last Year: Not on file    Uriel of Food in the Last Year: Not on file   Transportation Needs:     Lack of Transportation (Medical): Not on file    Lack of Transportation (Non-Medical):  Not on file   Physical Activity:     Days of Exercise per Week: Not on file    Minutes of Exercise per Session: Not on file   Stress:     Feeling of Stress : Not on file   Social Connections:     Frequency of Communication with Friends and Family: Not on file    Frequency of Social Gatherings with Friends and Family: Not on file    Attends Temple Services: Not on file    Active Member of [employer] or Organizations: Not on file    Attends Club or Organization Meetings: Not on file    Marital Status: Not on file   Intimate Partner Violence:     Fear of Current or Ex-Partner: Not on file    Emotionally Abused: Not on file    Physically Abused: Not on file    Sexually Abused: Not on file   Housing Stability:     Unable to Pay for Housing in the Last Year: Not on file    Number of [place] in the Last Year: Not on file    Unstable Housing in the Last Year: Not on file         PHYSICAL EXAMINATION:    /88   Pulse (!) 102 Ht 5' 8\" (1.727 m)   Wt 271 lb (122.9 kg)   SpO2 97%   BMI 41.21 kg/m²     General Appearance AOx3, in no acute distress  Eyes Conjunctivae/corneas clear. PERRL, EOM's intact. No scleral icterus  Ears, Nose, Throat ENT exam normal, no neck nodes or sinus tenderness  Neck supple, no significant adenopathy. No notable JVD  Respiratory No chest wall deformities or tenderness, respiratory effort normal, no use of accessory muscles. Cardiovascular RRR. No chest wall tenderness. Gastrointestinal Abdomen soft, nontender, nondistended. BS x4. No rebound, guarding, or rigidity present. No palpable masses. No CVA tenderness  Lymphatics No palpable lymphadenopathy, no hepatosplenomegaly  Musculoskeletal No joint tenderness, deformity or swelling. Full ROM UE/LE. Distal pulses intact UE/LE. No edema, cyanosis, or venous stasis changes. Skin Normal coloration and turgor, no rashes, no suspicious skin lesions noted  Neurological alert, oriented, normal speech, no focal findings or movement disorder noted, CN II-XII intact  Psychiatric Alert and oriented, appropriate affect      STUDIES: MRI Result (most recent):  MRI BREAST BILATERAL W WO CONTRAST 07/01/2022    Narrative  BREAST MRI BEFORE AND AFTER CONTRAST:    CLINICAL HISTORY:  Enlarging left axillary tail palpable mass status post May  2022 benign ultrasound biopsy in a 70-year-old with past medical history of 1994  excision of a dermatofibrosarcoma of the skin in the left upper abdomen. There  is also a family history of breast cancer in her mother at 48. TECHNIQUE: Standard axial and sagittal images were obtained before and during  bolus injection of 25 cc of ProHance IV. CAD was employed. COMPARISON:  Bilateral diagnostic mammography and left ultrasound of April 8, 2022, left ultrasound of May 6, 2022, left ultrasound biopsy of May 13, 2022,  and multiple earlier studies. FINDINGS: There is minimal background parenchymal enhancement bilaterally.   There does seem to be involving the dermis. I will plan to do this with frozen section to make sure that we have the appropriate sample as she had to undergo multiple surgeries last time to get appropriate margins. Risks of surgery discussed with pt and/or family present include risks of anesthesia (cardiopulmonary complications), MI, CVA, DVT,pain, bleeding, infection, injury to local viscera, wound problems, conversion to open procedure if laparoscopic procedure planned, and incomplete symptom resolution. They understand and agree to proceed.

## 2022-07-18 RX ORDER — M-VIT,TX,IRON,MINS/CALC/FOLIC 27MG-0.4MG
1 TABLET ORAL DAILY
COMMUNITY

## 2022-07-18 NOTE — PROGRESS NOTES
Patient verified name and . Order for consent NOT found in EHR and UNABLE to match case posting; patient verifies procedure. Type 1B surgery, phone assessment complete. Orders NOT received. Labs per surgeon: NONE. Labs per anesthesia protocol: POC Glucose, Potassium s/h dos (pt unable to come prior)    Patient answered medical/surgical history questions at their best of ability. All prior to admission medications documented in [facility]. Patient instructed to take the following medications the day of surgery according to anesthesia guidelines with a small sip of water: Atorvastatin and Omeprazole. Hold all vitamins 7 days prior to surgery and NSAIDS 5 days prior to surgery. Prescription meds to hold: Aspirin 81 mg   Patient instructed on the following:    > Arrive at [address] [place] [address], time of arrival to be called the day before by 1700  > NPO after midnight, unless otherwise indicated, including gum, mints, and ice chips  > Responsible adult must drive patient to the hospital, stay during surgery, and patient will need supervision 24 hours after anesthesia  > Use antibacterial soap in shower the night before surgery and on the morning of surgery  > All piercings must be removed prior to arrival.    > Leave all valuables (money and jewelry) at home but bring insurance card and ID on DOS.   > You may be required to pay a deductible or co-pay on the day of your procedure. You can pre-pay by calling [phone] if your surgery is at the [facility] or [phone] if your surgery is at the [facility]. > Do not wear make-up, nail polish, lotions, cologne, perfumes, powders, or oil on skin. Artificial nails are not permitted.

## 2022-07-19 ENCOUNTER — TELEPHONE (OUTPATIENT)
Dept: ONCOLOGY | Age: 62
End: 2022-07-19

## 2022-07-19 ENCOUNTER — HOSPITAL ENCOUNTER (OUTPATIENT)
Dept: PET IMAGING | Age: 62
Discharge: HOME | End: 2022-07-22
Payer: COMMERCIAL

## 2022-07-19 ENCOUNTER — ANESTHESIA EVENT (OUTPATIENT)
Dept: SURGERY | Age: 62
End: 2022-07-19
Payer: COMMERCIAL

## 2022-07-19 DIAGNOSIS — R92.8 ABNORMAL MRI, BREAST: ICD-10-CM

## 2022-07-19 DIAGNOSIS — Z87.898 HISTORY OF SOLITARY PULMONARY NODULE: ICD-10-CM

## 2022-07-19 DIAGNOSIS — N63.32 MASS OF AXILLARY TAIL OF LEFT BREAST: ICD-10-CM

## 2022-07-19 LAB
GLUCOSE BLD STRIP.AUTO-MCNC: 126 MG/DL (ref 65–100)
SERVICE CMNT-IMP: ABNORMAL

## 2022-07-19 PROCEDURE — A9552 F18 FDG: HCPCS | Performed by: INTERNAL MEDICINE

## 2022-07-19 PROCEDURE — 78816 PET IMAGE W/CT FULL BODY: CPT

## 2022-07-19 PROCEDURE — 6360000004 HC RX CONTRAST MEDICATION: Performed by: INTERNAL MEDICINE

## 2022-07-19 PROCEDURE — 82962 GLUCOSE BLOOD TEST: CPT

## 2022-07-19 PROCEDURE — 78815 PET IMAGE W/CT SKULL-THIGH: CPT

## 2022-07-19 PROCEDURE — 3430000000 HC RX DIAGNOSTIC RADIOPHARMACEUTICAL: Performed by: INTERNAL MEDICINE

## 2022-07-19 PROCEDURE — 2580000003 HC RX 258: Performed by: INTERNAL MEDICINE

## 2022-07-19 RX ORDER — FLUDEOXYGLUCOSE F 18 200 MCI/ML
12.83 INJECTION, SOLUTION INTRAVENOUS
Status: COMPLETED | OUTPATIENT
Start: 2022-07-19 | End: 2022-07-19

## 2022-07-19 RX ORDER — SODIUM CHLORIDE 0.9 % (FLUSH) 0.9 %
10 SYRINGE (ML) INJECTION AS NEEDED
Status: DISCONTINUED | OUTPATIENT
Start: 2022-07-19 | End: 2022-07-23 | Stop reason: HOSPADM

## 2022-07-19 RX ADMIN — DIATRIZOATE MEGLUMINE AND DIATRIZOATE SODIUM 10 ML: 660; 100 LIQUID ORAL; RECTAL at 08:05

## 2022-07-19 RX ADMIN — SODIUM CHLORIDE, PRESERVATIVE FREE 10 ML: 5 INJECTION INTRAVENOUS at 08:05

## 2022-07-19 RX ADMIN — FLUDEOXYGLUCOSE F 18 12.83 MILLICURIE: 200 INJECTION, SOLUTION INTRAVENOUS at 08:05

## 2022-07-19 NOTE — PERIOP NOTE
Directly informed patient and or family member of pre op arrival time 36 on 07/20/2022. All questions answered. Pre op instructions reviewed. Left contact information for any additional questions or needs.

## 2022-07-19 NOTE — TELEPHONE ENCOUNTER
Spoke to pt re her PET results - two areas of concern - one known in the axillary tail and another in the right thigh. She has sx scheduled tomorrow. She has decided to resect both areas of concern. We discussed the pathology will be sent for evaluation. She appreciated my call. All questions were answered to her satisfaction.

## 2022-07-19 NOTE — TELEPHONE ENCOUNTER
Notified that Dr. Governor Baptiste has not been in the office this week. She asked if I could send her a message to make her aware of another lump that they found.  Message sent to Dr. Governor Baptiste in regards to below

## 2022-07-19 NOTE — TELEPHONE ENCOUNTER
Pt stated she had a pet scan today 7/19. PT is having surgery tomorrow 7/20. PT stated on the pet scan from today 7/19 they seen another lump. PT would like to know if it is possible to get that lump removed as well tomorrow instead of having two different surgeries?

## 2022-07-20 ENCOUNTER — ANESTHESIA (OUTPATIENT)
Dept: SURGERY | Age: 62
End: 2022-07-20
Payer: COMMERCIAL

## 2022-07-20 ENCOUNTER — HOSPITAL ENCOUNTER (OUTPATIENT)
Age: 62
Setting detail: OUTPATIENT SURGERY
Discharge: HOME | End: 2022-07-20
Attending: STUDENT IN AN ORGANIZED HEALTH CARE EDUCATION/TRAINING PROGRAM | Admitting: STUDENT IN AN ORGANIZED HEALTH CARE EDUCATION/TRAINING PROGRAM
Payer: COMMERCIAL

## 2022-07-20 VITALS
BODY MASS INDEX: 41.49 KG/M2 | TEMPERATURE: 97.8 F | OXYGEN SATURATION: 95 % | WEIGHT: 273.8 LBS | SYSTOLIC BLOOD PRESSURE: 104 MMHG | HEART RATE: 72 BPM | DIASTOLIC BLOOD PRESSURE: 68 MMHG | RESPIRATION RATE: 16 BRPM | HEIGHT: 68 IN

## 2022-07-20 DIAGNOSIS — R22.2 CHEST WALL MASS: Primary | ICD-10-CM

## 2022-07-20 LAB
GLUCOSE BLD STRIP.AUTO-MCNC: 139 MG/DL (ref 65–100)
POTASSIUM BLD-SCNC: 3.7 MMOL/L (ref 3.5–5.1)
SERVICE CMNT-IMP: ABNORMAL

## 2022-07-20 PROCEDURE — 3600000002 HC SURGERY LEVEL 2 BASE: Performed by: STUDENT IN AN ORGANIZED HEALTH CARE EDUCATION/TRAINING PROGRAM

## 2022-07-20 PROCEDURE — 2580000003 HC RX 258: Performed by: NURSE ANESTHETIST, CERTIFIED REGISTERED

## 2022-07-20 PROCEDURE — 7100000001 HC PACU RECOVERY - ADDTL 15 MIN: Performed by: STUDENT IN AN ORGANIZED HEALTH CARE EDUCATION/TRAINING PROGRAM

## 2022-07-20 PROCEDURE — 7100000011 HC PHASE II RECOVERY - ADDTL 15 MIN: Performed by: STUDENT IN AN ORGANIZED HEALTH CARE EDUCATION/TRAINING PROGRAM

## 2022-07-20 PROCEDURE — 6370000000 HC RX 637 (ALT 250 FOR IP): Performed by: ANESTHESIOLOGY

## 2022-07-20 PROCEDURE — 88305 TISSUE EXAM BY PATHOLOGIST: CPT

## 2022-07-20 PROCEDURE — 2500000003 HC RX 250 WO HCPCS: Performed by: NURSE ANESTHETIST, CERTIFIED REGISTERED

## 2022-07-20 PROCEDURE — 2500000003 HC RX 250 WO HCPCS: Performed by: STUDENT IN AN ORGANIZED HEALTH CARE EDUCATION/TRAINING PROGRAM

## 2022-07-20 PROCEDURE — 7100000010 HC PHASE II RECOVERY - FIRST 15 MIN: Performed by: STUDENT IN AN ORGANIZED HEALTH CARE EDUCATION/TRAINING PROGRAM

## 2022-07-20 PROCEDURE — 3700000000 HC ANESTHESIA ATTENDED CARE: Performed by: STUDENT IN AN ORGANIZED HEALTH CARE EDUCATION/TRAINING PROGRAM

## 2022-07-20 PROCEDURE — 11406 EXC TR-EXT B9+MARG >4.0 CM: CPT | Performed by: STUDENT IN AN ORGANIZED HEALTH CARE EDUCATION/TRAINING PROGRAM

## 2022-07-20 PROCEDURE — 2580000003 HC RX 258: Performed by: ANESTHESIOLOGY

## 2022-07-20 PROCEDURE — 3700000001 HC ADD 15 MINUTES (ANESTHESIA): Performed by: STUDENT IN AN ORGANIZED HEALTH CARE EDUCATION/TRAINING PROGRAM

## 2022-07-20 PROCEDURE — 82962 GLUCOSE BLOOD TEST: CPT

## 2022-07-20 PROCEDURE — 6360000002 HC RX W HCPCS: Performed by: ANESTHESIOLOGY

## 2022-07-20 PROCEDURE — 6360000002 HC RX W HCPCS: Performed by: STUDENT IN AN ORGANIZED HEALTH CARE EDUCATION/TRAINING PROGRAM

## 2022-07-20 PROCEDURE — 84132 ASSAY OF SERUM POTASSIUM: CPT

## 2022-07-20 PROCEDURE — 7100000000 HC PACU RECOVERY - FIRST 15 MIN: Performed by: STUDENT IN AN ORGANIZED HEALTH CARE EDUCATION/TRAINING PROGRAM

## 2022-07-20 PROCEDURE — 6360000002 HC RX W HCPCS: Performed by: NURSE ANESTHETIST, CERTIFIED REGISTERED

## 2022-07-20 PROCEDURE — 2709999900 HC NON-CHARGEABLE SUPPLY: Performed by: STUDENT IN AN ORGANIZED HEALTH CARE EDUCATION/TRAINING PROGRAM

## 2022-07-20 PROCEDURE — 3600000012 HC SURGERY LEVEL 2 ADDTL 15MIN: Performed by: STUDENT IN AN ORGANIZED HEALTH CARE EDUCATION/TRAINING PROGRAM

## 2022-07-20 RX ORDER — OXYCODONE HYDROCHLORIDE 5 MG/1
5 TABLET ORAL PRN
Status: COMPLETED | OUTPATIENT
Start: 2022-07-20 | End: 2022-07-20

## 2022-07-20 RX ORDER — ACETAMINOPHEN 500 MG
1000 TABLET ORAL ONCE
Status: COMPLETED | OUTPATIENT
Start: 2022-07-20 | End: 2022-07-20

## 2022-07-20 RX ORDER — SODIUM CHLORIDE, SODIUM LACTATE, POTASSIUM CHLORIDE, CALCIUM CHLORIDE 600; 310; 30; 20 MG/100ML; MG/100ML; MG/100ML; MG/100ML
INJECTION, SOLUTION INTRAVENOUS CONTINUOUS PRN
Status: DISCONTINUED | OUTPATIENT
Start: 2022-07-20 | End: 2022-07-20 | Stop reason: SDUPTHER

## 2022-07-20 RX ORDER — MIDAZOLAM HYDROCHLORIDE 2 MG/2ML
2 INJECTION, SOLUTION INTRAMUSCULAR; INTRAVENOUS
Status: COMPLETED | OUTPATIENT
Start: 2022-07-20 | End: 2022-07-20

## 2022-07-20 RX ORDER — DIPHENHYDRAMINE HYDROCHLORIDE 50 MG/ML
12.5 INJECTION INTRAMUSCULAR; INTRAVENOUS
Status: DISCONTINUED | OUTPATIENT
Start: 2022-07-20 | End: 2022-07-20 | Stop reason: HOSPADM

## 2022-07-20 RX ORDER — HYDROMORPHONE HYDROCHLORIDE 2 MG/ML
0.5 INJECTION, SOLUTION INTRAMUSCULAR; INTRAVENOUS; SUBCUTANEOUS EVERY 5 MIN PRN
Status: DISCONTINUED | OUTPATIENT
Start: 2022-07-20 | End: 2022-07-20 | Stop reason: HOSPADM

## 2022-07-20 RX ORDER — ONDANSETRON 2 MG/ML
4 INJECTION INTRAMUSCULAR; INTRAVENOUS
Status: DISCONTINUED | OUTPATIENT
Start: 2022-07-20 | End: 2022-07-20 | Stop reason: HOSPADM

## 2022-07-20 RX ORDER — SODIUM CHLORIDE, SODIUM LACTATE, POTASSIUM CHLORIDE, CALCIUM CHLORIDE 600; 310; 30; 20 MG/100ML; MG/100ML; MG/100ML; MG/100ML
INJECTION, SOLUTION INTRAVENOUS CONTINUOUS
Status: DISCONTINUED | OUTPATIENT
Start: 2022-07-20 | End: 2022-07-20 | Stop reason: HOSPADM

## 2022-07-20 RX ORDER — PROPOFOL 10 MG/ML
INJECTION, EMULSION INTRAVENOUS PRN
Status: DISCONTINUED | OUTPATIENT
Start: 2022-07-20 | End: 2022-07-20 | Stop reason: SDUPTHER

## 2022-07-20 RX ORDER — OXYCODONE HYDROCHLORIDE 5 MG/1
10 TABLET ORAL PRN
Status: COMPLETED | OUTPATIENT
Start: 2022-07-20 | End: 2022-07-20

## 2022-07-20 RX ORDER — OXYCODONE HYDROCHLORIDE AND ACETAMINOPHEN 5; 325 MG/1; MG/1
1 TABLET ORAL EVERY 4 HOURS PRN
Qty: 15 TABLET | Refills: 0 | Status: SHIPPED | OUTPATIENT
Start: 2022-07-20 | End: 2022-07-25

## 2022-07-20 RX ORDER — SODIUM CHLORIDE 0.9 % (FLUSH) 0.9 %
5-40 SYRINGE (ML) INJECTION EVERY 12 HOURS SCHEDULED
Status: DISCONTINUED | OUTPATIENT
Start: 2022-07-20 | End: 2022-07-20 | Stop reason: HOSPADM

## 2022-07-20 RX ORDER — SODIUM CHLORIDE 0.9 % (FLUSH) 0.9 %
5-40 SYRINGE (ML) INJECTION PRN
Status: DISCONTINUED | OUTPATIENT
Start: 2022-07-20 | End: 2022-07-20 | Stop reason: HOSPADM

## 2022-07-20 RX ORDER — DOCUSATE SODIUM 100 MG/1
100 CAPSULE, LIQUID FILLED ORAL 2 TIMES DAILY
Qty: 60 CAPSULE | Refills: 0 | Status: SHIPPED | OUTPATIENT
Start: 2022-07-20 | End: 2022-08-19

## 2022-07-20 RX ORDER — LIDOCAINE HYDROCHLORIDE 20 MG/ML
INJECTION, SOLUTION EPIDURAL; INFILTRATION; INTRACAUDAL; PERINEURAL PRN
Status: DISCONTINUED | OUTPATIENT
Start: 2022-07-20 | End: 2022-07-20 | Stop reason: SDUPTHER

## 2022-07-20 RX ORDER — BUPIVACAINE HYDROCHLORIDE 5 MG/ML
INJECTION, SOLUTION EPIDURAL; INTRACAUDAL PRN
Status: DISCONTINUED | OUTPATIENT
Start: 2022-07-20 | End: 2022-07-20 | Stop reason: ALTCHOICE

## 2022-07-20 RX ORDER — HEPARIN SODIUM 5000 [USP'U]/ML
5000 INJECTION, SOLUTION INTRAVENOUS; SUBCUTANEOUS ONCE
Status: COMPLETED | OUTPATIENT
Start: 2022-07-20 | End: 2022-07-20

## 2022-07-20 RX ORDER — FENTANYL CITRATE 50 UG/ML
100 INJECTION, SOLUTION INTRAMUSCULAR; INTRAVENOUS
Status: DISCONTINUED | OUTPATIENT
Start: 2022-07-20 | End: 2022-07-20 | Stop reason: HOSPADM

## 2022-07-20 RX ADMIN — PROPOFOL 40 MG: 10 INJECTION, EMULSION INTRAVENOUS at 08:39

## 2022-07-20 RX ADMIN — Medication 3000 MG: at 08:39

## 2022-07-20 RX ADMIN — OXYCODONE 5 MG: 5 TABLET ORAL at 09:53

## 2022-07-20 RX ADMIN — HEPARIN SODIUM 5000 UNITS: 5000 INJECTION INTRAVENOUS; SUBCUTANEOUS at 06:49

## 2022-07-20 RX ADMIN — PROPOFOL 50 MG: 10 INJECTION, EMULSION INTRAVENOUS at 08:35

## 2022-07-20 RX ADMIN — SODIUM CHLORIDE, SODIUM LACTATE, POTASSIUM CHLORIDE, AND CALCIUM CHLORIDE: 600; 310; 30; 20 INJECTION, SOLUTION INTRAVENOUS at 08:33

## 2022-07-20 RX ADMIN — PHENYLEPHRINE HYDROCHLORIDE 200 MCG: 10 INJECTION INTRAVENOUS at 09:03

## 2022-07-20 RX ADMIN — PHENYLEPHRINE HYDROCHLORIDE 200 MCG: 10 INJECTION INTRAVENOUS at 08:53

## 2022-07-20 RX ADMIN — ACETAMINOPHEN 1000 MG: 500 TABLET, FILM COATED ORAL at 06:49

## 2022-07-20 RX ADMIN — PHENYLEPHRINE HYDROCHLORIDE 200 MCG: 10 INJECTION INTRAVENOUS at 09:11

## 2022-07-20 RX ADMIN — LIDOCAINE HYDROCHLORIDE 40 MG: 20 INJECTION, SOLUTION EPIDURAL; INFILTRATION; INTRACAUDAL; PERINEURAL at 08:35

## 2022-07-20 RX ADMIN — FENTANYL CITRATE 25 MCG: 50 INJECTION INTRAMUSCULAR; INTRAVENOUS at 08:47

## 2022-07-20 RX ADMIN — MIDAZOLAM 2 MG: 1 INJECTION INTRAMUSCULAR; INTRAVENOUS at 07:04

## 2022-07-20 RX ADMIN — FENTANYL CITRATE 25 MCG: 50 INJECTION INTRAMUSCULAR; INTRAVENOUS at 08:39

## 2022-07-20 RX ADMIN — SODIUM CHLORIDE, POTASSIUM CHLORIDE, SODIUM LACTATE AND CALCIUM CHLORIDE: 600; 310; 30; 20 INJECTION, SOLUTION INTRAVENOUS at 06:50

## 2022-07-20 RX ADMIN — PHENYLEPHRINE HYDROCHLORIDE 200 MCG: 10 INJECTION INTRAVENOUS at 09:08

## 2022-07-20 RX ADMIN — PROPOFOL 140 MCG/KG/MIN: 10 INJECTION, EMULSION INTRAVENOUS at 08:36

## 2022-07-20 ASSESSMENT — ENCOUNTER SYMPTOMS
ABDOMINAL DISTENTION: 0
CHEST TIGHTNESS: 0
CONSTIPATION: 0
NAUSEA: 0
SHORTNESS OF BREATH: 0
VOMITING: 0
VOICE CHANGE: 0
TROUBLE SWALLOWING: 0
SCLERAL ICTERUS: 0
WHEEZING: 0
DIARRHEA: 0
HEMOPTYSIS: 0
BLOOD IN STOOL: 0
SORE THROAT: 0
ABDOMINAL PAIN: 0

## 2022-07-20 ASSESSMENT — PAIN DESCRIPTION - LOCATION: LOCATION: CHEST

## 2022-07-20 ASSESSMENT — PAIN - FUNCTIONAL ASSESSMENT: PAIN_FUNCTIONAL_ASSESSMENT: NONE - DENIES PAIN

## 2022-07-20 ASSESSMENT — PAIN SCALES - GENERAL: PAINLEVEL_OUTOF10: 5

## 2022-07-20 NOTE — DISCHARGE INSTRUCTIONS
DISCHARGE INSTRUCTIONS    Please call our office for a follow-up appointment in 1-2 week(s). Driving: No driving while taking pain medications    Activity: No strenous activity. Slowly advance as tolerated. No lifting greater than 20lbs. Diet:  Slowly advance as tolerated. Increase your fluids and fiber, as pain medications may cause constipation. No alcoholic beverages. Bathing: You may shower. No tub baths for 5 days. Dressing: If outer dressing, remove in 24 hours. Leave steri strips intact. Other:  Ice to incision as needed for pain. If drains present, empty and record    output daily. Call Dr. Cherelle Cotton if you have:  ? Temperature greater than 100.4  ? Persistent nausea and vomiting  ? Severe uncontrolled pain  ? Redness, tenderness, or signs of infection (pain, swelling, redness, odor or green/yellow discharge around the site)  ? Difficulty breathing, headache or visual disturbances  ? Hives  ? Persistent dizziness or light-headedness  ? Extreme fatigue  ? Any other questions or concerns you may have after discharge    In an emergency, call 911 or go to an Emergency Department at [facility] or [facility].    It is important to bring a complete, current list of your medications to any medical appointments or hospitalizations. MEDICATION INTERACTION:  During your procedure you potentially received a medication or medications which may reduce the effectiveness of oral contraceptives. Please consider other forms of contraception for 1 month following your procedure if you are currently using oral contraceptives as your primary form of birth control.  In addition to this, we recommend continuing your oral contraceptive as prescribed, unless otherwise instructed by your physician, during this time    After general anesthesia or intravenous sedation, for 24 hours or while taking prescription Narcotics:  Limit your activities  A responsible adult needs to be with you for the next 24 hours  Do not drive and operate hazardous machinery  Do not make important personal or business decisions  Do not drink alcoholic beverages  If you have not urinated within 8 hours after discharge, and you are experiencing discomfort from urinary retention, please go to the nearest ED. If you have sleep apnea and have a CPAP machine, please use it for all naps and sleeping. Please use caution when taking narcotics and any of your home medications that may cause drowsiness. *  Please give a list of your current medications to your Primary Care Provider. *  Please update this list whenever your medications are discontinued, doses are      changed, or new medications (including over-the-counter products) are added. *  Please carry medication information at all times in case of emergency situations. These are general instructions for a healthy lifestyle:  No smoking/ No tobacco products/ Avoid exposure to second hand smoke  Surgeon General's Warning:  Quitting smoking now greatly reduces serious risk to your health. Obesity, smoking, and sedentary lifestyle greatly increases your risk for illness  A healthy diet, regular physical exercise & weight monitoring are important for maintaining a healthy lifestyle    You may be retaining fluid if you have a history of heart failure or if you experience any of the following symptoms:  Weight gain of 3 pounds or more overnight or 5 pounds in a week, increased swelling in our hands or feet or shortness of breath while lying flat in bed. Please call your doctor as soon as you notice any of these symptoms; do not wait until your next office visit.

## 2022-07-20 NOTE — ANESTHESIA POSTPROCEDURE EVALUATION
Department of Anesthesiology  Postprocedure Note    Patient: Cecily Espinoza  MRN: [id number]  YOB: 1960  Date of evaluation: 7/20/2022      Procedure Summary     Date: 07/20/22 Room / Location:  MAIN OR 09 /  MAIN OR    Anesthesia Start: 0832 Anesthesia Stop: 2058    Procedure: LEFT CHEST WALL AND RIGHT ANTERIOR THIGH MASS EXCISION WITH FROZEN SECTION (Left: Chest) Diagnosis:       Chest wall mass      (Chest wall mass [R22.2])    Providers: Quita Grimes DO Responsible Provider: Vasu Noonan MD    Anesthesia Type: TIVA ASA Status: 3          Anesthesia Type: No value filed.     Edis Phase I: Edis Score: 8    Edis Phase II: Edis Score: 10      Anesthesia Post Evaluation    Patient location during evaluation: PACU  Patient participation: complete - patient participated  Level of consciousness: awake and alert  Airway patency: patent  Nausea & Vomiting: no nausea and no vomiting  Complications: no  Cardiovascular status: hemodynamically stable  Respiratory status: acceptable, nonlabored ventilation and spontaneous ventilation  Hydration status: euvolemic  Comments: /68   Pulse 73   Temp 97.5 °F (36.4 °C) (Skin)   Resp 15   Ht 5' 8\" (1.727 m)   Wt 273 lb 12.8 oz (124.2 kg)   SpO2 95%   BMI 41.63 kg/m²     Multimodal analgesia pain management approach

## 2022-07-20 NOTE — PROGRESS NOTES
Patrick Gibson (:  1960) has requested an audio/video evaluation for the following concern(s):    Patrick Gibson, was evaluated through a synchronous (real-time) audio-video encounter. The patient (or guardian if applicable) is aware that this is a billable service, which includes applicable co-pays. This Virtual Visit was conducted with patient's (and/or legal guardian's) consent. The visit was conducted pursuant to the emergency declaration under the [facility], [address] [place] [address] authority and the [employer] Act. Patient identification was verified, and a caregiver was present when appropriate. The patient was located at Home: [address] [place] [address]. Provider was located at [facility]    Total time spent on this encounter:  [facility] Hematology and Oncology: Established patient - follow up     Chief Complaint   Patient presents with    Follow-up     Reason for Referral: Abnormal breast biopsy; Mass of axillary tail of left breast; History of skin cancer; Family history of breast cancer in mother  Referring Provider: SHIRLEY Jeter CNP  Family History of Cancer/Hematologic Disorders: Family history is significant for mother with breast cancer. Presenting Symptoms: Lump in left breast axillary tail    History of Present Illness:  Ms. Indiana Goncalves is a 63 y.o. female who presents today for follow up regarding recent mass in the axillary breast tail. 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  On 22 she presented to her PCPs office with complaint of left breast lump.   Physical exam confirmed a firm and non-tender left breast mass measuring approximately 1 cm has grown some since bx. She has a hx of left breast excision - years ago - benign findings. 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 She stated that this required 3 surgeries to obtain clear margins. No LN sampling at that time. We reviewed the images from her MRI. Plan is to p/w surgery and resection of the lesion. Path from 5/13 bx c/w lymph node having foamy histiocytes associated with acute inflammation - non-malignant. In the interim, pt also underwent bx of skin lesion and path is somewhat inconclusive 5/16: Neoplasm of uncertain behavior versus BCC versus irritated nevus. I have discussed pt's case at tumor board and recommend excision with good margins. plan to send path for outside review. No b symptoms. Pt ok with me reviewing case with Dr Paramjit Kim at [facility] once we have path if sarcoma. Today, patient is here for virtual visit after her surgery yesterday and PET scan. During today's virtual visit we discussed her PET scan does and reviewed 2 lesions that were PET avid 1 in the left axillary tail the other new finding on the PET scan which was the right thigh. I had a discussion after the PET scan with the patient on Tuesday and she elected to proceed with surgery with Dr. Vear Eisenmenger yesterday. Both lesions in the axillary breast tail as well as the right thigh have been resected. She is feeling well after surgery. Using minimal amounts of pain medication. We discussed how to take pain medications and bowel regimen as needed. She is feeling some swelling in her neck which she attributes to lymph node enlargement but has no sore throat. She will monitor that. No fevers. Pathology is not back considering her surgery was just yesterday. She is interested in having her path reviewed at a tertiary sarcoma center if it turns to be concerning for sarcoma. We discussed how to best communicate with me in the interim.   Patient asked to call if she sees pathology results in my chart and has not heard from us yet and verbalized understanding that she may see the results before I do. Chronological Events:   BILATERAL DIAGNOSTIC DIGITAL MAMMOGRAM WITH TOMOSYNTHESIS AND LEFT BREAST ULTRASOUND 4/8/22  FINDINGS: There is an area of ill-defined increased density in the left axillary tail which corresponds to the palpable abnormality. This is superior to prior lumpectomy site. No other masslike areas are seen in either breast.    LEFT BREAST ULTRASOUND: There is a 1.1 cm hypoechoic lesion in the left axillary tail. The surrounding tissue is echogenic. No other masslike areas are seen. IMPRESSION: Left axilla tail lesion appears inflammatory, possibly a small abscess. Suggest treatment with antibiotics. Follow-up ultrasound in 3-4 weeks to document resolution. BI-RADS Assessment Category 3: Probably benign- Short-interval follow-up  suggested. (#BRad3)     LEFT BREAST ULTRASOUND 5/6/22  FINDINGS: Ultrasound of the upper outer left breast shows a persistent hyperechoic area with central hypoechoic tissue, possibly fluid. The central hypoechoic area measures 1.1 cm. The larger hyperechoic region measures 3.6 x 1.6 cm. There is no significant change in appearance compared with the prior  ultrasound from 04/08/2022. IMPRESSION: Persistent masslike area in the left axillary tail. Most likely inflammatory, but it has not improved with antibiotic treatment. Ultrasound guided biopsy/aspiration is recommended to exclude an atypical tumor or infection. BI-RADS Assessment Category 4: Suspicious Finding- Biopsy should be considered.  (#BRad4)     [facility] SURGICAL PATHOLOGY REPORT 5/13/22 6/27/22 heme/onc consultation   7/1/22 MRI - SIGNIFICANT INTERVAL INCREASE IN SIZE OF THE PALPABLE LEFT AXILLARY TAIL MASS WITH DERMAL INVOLVEMENT SINCE BIOPSY ON MAY 13 MUST BE CONSIDERED SUSPICIOUS FOR MALIGNANCY UNTIL PROVEN OTHERWISE, AND THE POSSIBILITY OF  DERMATOFIBROSARCOMA OR OTHER ATYPICAL NEOPLASM IS SUGGESTED IN THIS 65-YEAR-OLD  STATUS POST REMOTE SURGICAL EXCISION OF A LEFT UPPER ABDOMINAL DERMATOFIBROSARCOMA. FOLLOW-UP SURGICAL CONSULTATION IS RECOMMENDED FOR CONSIDERATION OF EXCISIONAL BIOPSY. 2.  A 7 MM FOCUS OF RIGHT POSTERIOR 2:30 SUSPICIOUS DERMAL ENHANCEMENT IS NONSPECIFIC BUT COULD REPRESENT AN ADDITIONAL SKIN NEOPLASM. CORRELATION WITH PHYSICAL EXAMINATION AND  CONSIDERATION OF PUNCH BIOPSY ARE RECOMMENDED.  7/8/22 FU after imaging and tumor board - plan is to pw sx; MR results reviewed with pt/; PET   7/20/22 sx - resection of axillary tail mass and thigh mass  7/21/22 VV to review PET results and discuss plan as we await path from sx per her wishes       Family History   Problem Relation Age of Onset    Cancer Mother         breast      Social History     Socioeconomic History    Marital status:      Spouse name: None    Number of children: None    Years of education: None    Highest education level: None   Tobacco Use    Smoking status: Never    Smokeless tobacco: Never   Vaping Use    Vaping Use: Never used   Substance and Sexual Activity    Alcohol use: Yes     Comment: rarely    Drug use: No        Review of Systems   Constitutional:  Negative for appetite change, chills, diaphoresis, fatigue, fever and unexpected weight change. HENT:   Negative for hearing loss, mouth sores, nosebleeds, sore throat, trouble swallowing and voice change. Eyes:  Negative for icterus. Respiratory:  Negative for chest tightness, hemoptysis, shortness of breath and wheezing. Cardiovascular:  Negative for chest pain, leg swelling and palpitations. Gastrointestinal:  Negative for abdominal distention, abdominal pain, blood in stool, constipation, diarrhea, nausea and vomiting. Endocrine: Negative for hot flashes. Genitourinary:  Negative for difficulty urinating, frequency, vaginal bleeding and vaginal discharge.     Musculoskeletal: Negative for arthralgias, flank pain, gait problem and myalgias. Skin:  Negative for itching, rash and wound. Neurological:  Negative for dizziness, extremity weakness, gait problem, headaches and numbness. Psychiatric/Behavioral:  Negative for confusion and depression. The patient is nervous/anxious. No Known Allergies  Past Medical History:   Diagnosis Date    Adenomatous polyp of colon 03/17/2017    Cancer ([facility] [address] 1993    skin; Dermatofibro sarcoma in the left upper abdomen    Diabetes ([facility] [address]     oral reliant; AVG ; pt denies s.s. of hypoglycemia; last A1C    Fundic gland polyposis of stomach 03/17/2017    GERD (gastroesophageal reflux disease)     manage with med    Hyperlipidemia     Hypertension     managed with med    Sleep apnea     Sleeps with a CPAP     Past Surgical History:   Procedure Laterality Date    CHEST WALL RESECTION Left 7/20/2022    LEFT CHEST WALL AND RIGHT ANTERIOR THIGH MASS EXCISION WITH FROZEN SECTION [facility] DO [facility] [address] [facility]    ORTHOPEDIC SURGERY Right 2005    ankle surgery    OTHER SURGICAL HISTORY Left 1994    cancer removal from left upper abdomen    US BREAST NEEDLE BIOPSY LEFT Left 05/13/2022    US BREAST NEEDLE BIOPSY LEFT 5/13/2022 [facility] RADIOLOGY MAMMO     Current Outpatient Medications   Medication Sig Dispense Refill    oxyCODONE-acetaminophen (PERCOCET) 5-325 MG per tablet Take 1 tablet by mouth every 4 hours as needed for Pain for up to 5 days. 15 tablet 0    Multiple Vitamins-Minerals (THERAPEUTIC MULTIVITAMIN-MINERALS) tablet Take 1 tablet by mouth in the morning.       aspirin 81 MG EC tablet Take 81 mg by mouth every evening Preventative Only-pt denies heart attack, stroke, blood clots and stents      atorvastatin (LIPITOR) 40 MG tablet Take 40 mg by mouth daily      lisinopril-hydroCHLOROthiazide (PRINZIDE;ZESTORETIC) 20-25 MG per tablet Take 0.5 tablets by mouth every evening Collection Time: 07/20/22  6:20 AM   Result Value Ref Range    POC Glucose 139 (H) 65 - 100 mg/dL    Performed by: Elena        Imaging: reviewed     PATHOLOGY:         ASSESSMENT:     Diagnosis Orders   1. Abnormal mammogram        2. History of sarcoma        3. Chest wall mass        4. Abnormal positron emission tomography (PET) scan              Ms. Ana Quiles is here for FU of left breast mass. 1. Left axillary tail breat mass - ~2cm on examination   Abnormal PET of breast tail and right thigh lesion     - here for virtual visit after her surgery yesterday and PET scan. During today's virtual visit we discussed her PET scan does and reviewed 2 lesions that were PET avid 1 in the left axillary tail the other new finding on the PET scan which was the right thigh. I had a discussion after the PET scan with the patient on Tuesday and she elected to proceed with surgery with Dr. Mali Maki yesterday. Both lesions in the axillary breast tail as well as the right thigh have been resected. She is feeling well after surgery. Using minimal amounts of pain medication. We discussed how to take pain medications and bowel regimen as needed. She is feeling some swelling in her neck which she attributes to lymph node enlargement but has no sore throat. She will monitor that. No fevers. Pathology is not back considering her surgery was just yesterday. She is interested in having her path reviewed at a tertiary sarcoma center if it turns to be concerning for sarcoma. We discussed how to best communicate with me in the interim. Patient asked to call if she sees pathology results in my chart and has not heard from us yet and verbalized understanding that she may see the results before I do. - we discussed her recent imaging and pathology in detail. Pt feels the mass has grown since bx. She has a hx of left breast excision - years ago - benign findings.   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 She stated that this required 3 surgeries to obtain clear margins. No LN sampling at that time. RESUSCITATION DIRECTIVES/HOSPICE CARE: Full Support    RTC per sched     MDM  Number of Diagnoses or Management Options  Abnormal mammogram: new, needed workup  Abnormal positron emission tomography (PET) scan: new, needed workup  Chest wall mass: new, needed workup     Amount and/or Complexity of Data Reviewed  Clinical lab tests: reviewed  Tests in the radiology section of CPT®: ordered and reviewed  Review and summarize past medical records: yes  Discuss the patient with other providers: yes  Independent visualization of images, tracings, or specimens: yes    Risk of Complications, Morbidity, and/or Mortality  Presenting problems: moderate  Diagnostic procedures: low  Management options: moderate        Lab studies and imaging studies were personally reviewed. Pertinent old records were reviewed. Historical:   - new dermal lesion over her right medial breast - somewhat violacoious in color - I think this should be considered for bx, considering her hx. Appears different from her other cherry angiomas. She agrees with the plan and will call her dermatologist.     - refer to surgery - Dr Violet Yee; will plan on tumor board discussion   - s/p MRI - images reviewed and growth noted; case d/w tumor board - recommend excision of breast tail lesion with generous margins and also resection of biopsied right breast area; I have discussed pt's case with Dr Violet Yee. - will get PET as soon as possible - will likely have results before sx date    - pt ok with us sending final path for outside review to academic center; also ok with me reviewing her case with [facility] expert once we have pathology    All questions were asked and answered to the best of my ability. The patient verbalized understanding and agrees with the plan above.             [place] Sonia Mccollum, [address] [facility] Hematology and Oncology  [address] [place] [address]  Office : [phone]  Fax : [phone]

## 2022-07-20 NOTE — OP NOTE
Wide Excision of left chest wall Mass and right thigh mass Procedure Note      Name:  Patrick Gibson Date/Time of Admission: 2022  5:48 AM  CSN: [id number]  Attending Provider: No att. providers found  MRN:  [id number]  : 1960 63 y.o. Pre-operative Diagnosis: left chest wall Mass. right thigh mass    Post-operative Diagnosis: Same    Procedure: Wide excision of left chest wall mass and right thigh mass    Surgeon: Yoon Rosado DO     Anesthesia: MAC anesthesia    Specimen: mass     INDICATION: The patient is a 61-year-old female who complains of mass to the left chest and right thigh. Patient has a history of dermatofibrosarcoma. She underwent PET scan and these 2 areas lit up concerning for malignancy. They would like it surgically removed. DESCRIPTION OF PROCEDURE: The patient was correctly identified in preoperative holding area. Consent was confirmed and placed on the chart. Preoperative antibiotics were administered per SCIP protocol. The patient was then taken back to the operative suite and placed in the supine position. MAC anesthesia was performed per anesthesia. The patient's was then prepped and draped in the usual sterile fashion. A timeout was performed and all members of the team that were present were in agreement. The procedure began by making an elliptical incision over the apex of the mass of the left chest with 3 cm margins. Electrocautery was used to dissect out a wide excision with good margins. The area was marked short superior long lateral hemostasis was achieved with electric cautery. The wound was irrigated with normal saline. 3-0 Monocryl suture was used to close the skin in a simple interrupted fashion. Mastisol and Steri-Strips was placed on the incision. At this time, the procedure was complete. I then moved my attention to the right thigh and in a similar fashion 3 cm margins were used to ellipse out the mass.   Hemostasis was noted.  I marked them edges as short superior long lateral.  I did use a combination of 3-0 Monocryl and horizontal mattresses using Prolene as the middle portion of the incision did have some tension. Mastisol and Steri-Strip placed over the incision. At the conclusion of the case all sponge, needles and instrument counts were correct. The patient tolerated well and was taken to the [address] [place] [address] Unit. They will be readmitted to the floor for further postoperative care    Chest wall mass: 9.2 x 8.9 x 4  Right thigh mass: 11.4 x 6.8 x 4 cm.         [address] [place],

## 2022-07-20 NOTE — ANESTHESIA PRE PROCEDURE
Department of Anesthesiology  Preprocedure Note       Name:  Chelsie May   Age:  63 y.o.  :  1960                                          MRN:  [id number]         Date:  2022      Surgeon: Lord Basurto):  Charlie Ray DO    Procedure: Procedure(s):  BILATERAL CHEST WALL MASS EXCISION WITH FROZEN SECTION    Medications prior to admission:   Prior to Admission medications    Medication Sig Start Date End Date Taking? Authorizing Provider   Multiple Vitamins-Minerals (THERAPEUTIC MULTIVITAMIN-MINERALS) tablet Take 1 tablet by mouth in the morning.    Yes Historical Provider, MD   aspirin 81 MG EC tablet Take 81 mg by mouth every evening Preventative Only-pt denies heart attack, stroke, blood clots and stents 22   Ar Automatic Reconciliation   atorvastatin (LIPITOR) 40 MG tablet Take 40 mg by mouth daily 22   Ar Automatic Reconciliation   lisinopril-hydroCHLOROthiazide (PRINZIDE;ZESTORETIC) 20-25 MG per tablet Take 0.5 tablets by mouth every evening 22   Ar Automatic Reconciliation   metFORMIN (GLUCOPHAGE) 1000 MG tablet TAKE 1 TABLET BY MOUTH TWICE A DAY WITH MEALS 22   Ar Automatic Reconciliation   omeprazole (PRILOSEC) 20 MG delayed release capsule Take 20 mg by mouth daily 22   Ar Automatic Reconciliation       Current medications:    Current Facility-Administered Medications   Medication Dose Route Frequency Provider Last Rate Last Admin    fentaNYL (SUBLIMAZE) injection 100 mcg  100 mcg IntraVENous Once PRN Jonny Paulson MD        lactated ringers infusion   IntraVENous Continuous Jonny Paulson  mL/hr at 22 0650 New Bag at 22 0650    sodium chloride flush 0.9 % injection 5-40 mL  5-40 mL IntraVENous 2 times per day Jonny Paulson MD        sodium chloride flush 0.9 % injection 5-40 mL  5-40 mL IntraVENous PRN Jonny Paulson MD        midazolam PF (VERSED) injection 2 mg  2 mg IntraVENous Once PRN Jonny Paulson MD  ceFAZolin (ANCEF) 3000 mg in sterile water 30 mL IV syringe  3,000 mg IntraVENous Once Joao Rizo DO         Facility-Administered Medications Ordered in Other Encounters   Medication Dose Route Frequency Provider Last Rate Last Admin    sodium chloride flush 0.9 % injection 10 mL  10 mL IntraVENous PRN Amari Moses MD   10 mL at 07/19/22 0805    diatrizoate meglumine-sodium (GASTROGRAFIN) 66-10 % solution 10 mL  10 mL Oral ONCE PRN Amari Moses MD   10 mL at 07/19/22 0805       Allergies:  No Known Allergies    Problem List:    Patient Active Problem List   Diagnosis Code    Fatty liver K76.0    Post-traumatic osteoarthritis of right ankle M19.171    Urinary tract infection with hematuria N39.0, R31.9    FERCHO (obstructive sleep apnea) G47.33    Obesity, morbid (HCC) E66.01    History of skin cancer Z85.828    Dysuria R30.0    Hypertension I10    Controlled type 2 diabetes mellitus, without long-term current use of insulin (HCC) E11.9    Spondylosis of cervical joint M47.812    Gastroesophageal reflux disease without esophagitis K21.9    History of colon polyps Z86.010    History of solitary pulmonary nodule Z87.898    Mass of axillary tail of left breast N63.32    Abnormal mammogram R92.8    History of sarcoma Z85.831    Abnormal MRI, breast R92.8    Personal history of sarcoma of soft tissue Z85.831       Past Medical History:        Diagnosis Date    Adenomatous polyp of colon 03/17/2017    Cancer ([facility] [address] 1993    skin; 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 of hypoglycemia; last A1C    Fundic gland polyposis of stomach 03/17/2017    GERD (gastroesophageal reflux disease)     manage with med    Hyperlipidemia     Hypertension     managed with med    Sleep apnea     Sleeps with a CPAP       Past Surgical History:        Procedure Laterality Date    CHOLECYSTECTOMY      HEENT      wisdom teeth  ORTHOPEDIC SURGERY Right 2005    ankle surgery    OTHER SURGICAL HISTORY Left 1994    cancer removal from left upper abdomen    US BREAST NEEDLE BIOPSY LEFT Left 05/13/2022    US BREAST NEEDLE BIOPSY LEFT 5/13/2022 [facility]       Social History:    Social History     Tobacco Use    Smoking status: Never    Smokeless tobacco: Never   Substance Use Topics    Alcohol use: Yes     Comment: rarely                                Counseling given: Not Answered      Vital Signs (Current):   Vitals:    07/18/22 0934 07/20/22 0611   BP:  134/78   Pulse:  83   Resp:  16   Temp:  97.7 °F (36.5 °C)   TempSrc:  Oral   SpO2:  96%   Weight: 270 lb (122.5 kg) 273 lb 12.8 oz (124.2 kg)   Height: 5' 8\" (1.727 m) 5' 8\" (1.727 m)                                              BP Readings from Last 3 Encounters:   07/20/22 134/78   07/14/22 130/88   07/08/22 (!) 139/92       NPO Status: Time of last liquid consumption: 2030                        Time of last solid consumption: 2030                        Date of last liquid consumption: 07/19/22                        Date of last solid food consumption: 07/19/22    BMI:   Wt Readings from Last 3 Encounters:   07/20/22 273 lb 12.8 oz (124.2 kg)   07/14/22 271 lb (122.9 kg)   07/08/22 271 lb (122.9 kg)     Body mass index is 41.63 kg/m².     CBC:   Lab Results   Component Value Date/Time    WBC 9.5 04/08/2022 09:05 AM    RBC 4.66 04/08/2022 09:05 AM    HGB 13.2 04/08/2022 09:05 AM    HCT 39.5 04/08/2022 09:05 AM    MCV 85 04/08/2022 09:05 AM    RDW 13.8 04/08/2022 09:05 AM     04/08/2022 09:05 AM       CMP:   Lab Results   Component Value Date/Time     04/08/2022 09:05 AM    K 4.3 04/08/2022 09:05 AM     04/08/2022 09:05 AM    CO2 23 04/08/2022 09:05 AM    BUN 10 04/08/2022 09:05 AM    CREATININE 0.77 04/08/2022 09:05 AM    GFRAA 114 12/03/2021 08:48 AM    AGRATIO 2.3 04/08/2022 09:05 AM    GLUCOSE 101 04/08/2022 09:05 AM    PROT 6.9 04/08/2022 09:05 AM    CALCIUM 9.8 04/08/2022 09:05 AM    BILITOT 0.3 04/08/2022 09:05 AM    ALKPHOS 85 04/08/2022 09:05 AM    AST 17 04/08/2022 09:05 AM    ALT 33 04/08/2022 09:05 AM       POC Tests:   Recent Labs     07/20/22  0620   POCGLU 139*   POCK 3.7       Coags: No results found for: PROTIME, INR, APTT    HCG (If Applicable): No results found for: PREGTESTUR, PREGSERUM, HCG, HCGQUANT     ABGs: No results found for: PHART, PO2ART, KJC4XOJ, VBG4UWK, BEART, F0BEICBV     Type & Screen (If Applicable):  No results found for: LABABO, LABRH    Drug/Infectious Status (If Applicable):  No results found for: HIV, HEPCAB    COVID-19 Screening (If Applicable): No results found for: COVID19        Anesthesia Evaluation  Patient summary reviewed and Nursing notes reviewed  Airway: Mallampati: II  TM distance: >3 FB   Neck ROM: full  Mouth opening: > = 3 FB   Dental: normal exam         Pulmonary:normal exam  breath sounds clear to auscultation  (+) sleep apnea:                             Cardiovascular:  Exercise tolerance: good (>4 METS),   (+) hypertension:,         Rhythm: regular  Rate: normal                    Neuro/Psych:   Negative Neuro/Psych ROS              GI/Hepatic/Renal:   (+) GERD:, morbid obesity          Endo/Other:    (+) Diabetes, . Abdominal:             Vascular: negative vascular ROS. Other Findings:           Anesthesia Plan      TIVA     ASA 3       Induction: intravenous. Anesthetic plan and risks discussed with patient.                         Jenny Valverde MD   7/20/2022

## 2022-07-21 ENCOUNTER — TELEMEDICINE (OUTPATIENT)
Dept: ONCOLOGY | Age: 62
End: 2022-07-21
Payer: COMMERCIAL

## 2022-07-21 DIAGNOSIS — R92.8 ABNORMAL MAMMOGRAM: Primary | ICD-10-CM

## 2022-07-21 DIAGNOSIS — Z85.831 HISTORY OF SARCOMA: ICD-10-CM

## 2022-07-21 DIAGNOSIS — R22.2 CHEST WALL MASS: ICD-10-CM

## 2022-07-21 DIAGNOSIS — R94.8 ABNORMAL POSITRON EMISSION TOMOGRAPHY (PET) SCAN: ICD-10-CM

## 2022-07-21 PROCEDURE — 99213 OFFICE O/P EST LOW 20 MIN: CPT | Performed by: INTERNAL MEDICINE

## 2022-07-21 ASSESSMENT — PATIENT HEALTH QUESTIONNAIRE - PHQ9
SUM OF ALL RESPONSES TO PHQ QUESTIONS 1-9: 0
2. FEELING DOWN, DEPRESSED OR HOPELESS: 0
SUM OF ALL RESPONSES TO PHQ9 QUESTIONS 1 & 2: 0
SUM OF ALL RESPONSES TO PHQ QUESTIONS 1-9: 0
1. LITTLE INTEREST OR PLEASURE IN DOING THINGS: 0

## 2022-07-25 PROBLEM — R94.8 ABNORMAL POSITRON EMISSION TOMOGRAPHY (PET) SCAN: Status: ACTIVE | Noted: 2022-07-25

## 2022-08-01 ENCOUNTER — TELEPHONE (OUTPATIENT)
Dept: ONCOLOGY | Age: 62
End: 2022-08-01

## 2022-08-02 ENCOUNTER — TELEPHONE (OUTPATIENT)
Dept: ONCOLOGY | Age: 62
End: 2022-08-02

## 2022-08-03 ENCOUNTER — OFFICE VISIT (OUTPATIENT)
Dept: SURGERY | Age: 62
End: 2022-08-03

## 2022-08-03 DIAGNOSIS — Z09 POSTOPERATIVE EXAMINATION: Primary | ICD-10-CM

## 2022-08-03 PROCEDURE — 99024 POSTOP FOLLOW-UP VISIT: CPT | Performed by: STUDENT IN AN ORGANIZED HEALTH CARE EDUCATION/TRAINING PROGRAM

## 2022-08-03 NOTE — PROGRESS NOTES
General Surgery Office Visit:    Pt presents s/p wide excision of left chest wall mass and right thigh mass. Pt overall doing well, minimal pain. Tolerating diet and having bowel function. Medications:   Current Outpatient Medications   Medication Sig Dispense Refill    docusate sodium (COLACE) 100 MG capsule Take 1 capsule by mouth in the morning and 1 capsule before bedtime. (Patient not taking: Reported on 7/21/2022) 60 capsule 0    Multiple Vitamins-Minerals (THERAPEUTIC MULTIVITAMIN-MINERALS) tablet Take 1 tablet by mouth in the morning. aspirin 81 MG EC tablet Take 81 mg by mouth every evening Preventative Only-pt denies heart attack, stroke, blood clots and stents      atorvastatin (LIPITOR) 40 MG tablet Take 40 mg by mouth daily      lisinopril-hydroCHLOROthiazide (PRINZIDE;ZESTORETIC) 20-25 MG per tablet Take 0.5 tablets by mouth every evening      metFORMIN (GLUCOPHAGE) 1000 MG tablet TAKE 1 TABLET BY MOUTH TWICE A DAY WITH MEALS      omeprazole (PRILOSEC) 20 MG delayed release capsule Take 20 mg by mouth daily       No current facility-administered medications for this visit.        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 of hypoglycemia; last A1C    Fundic gland polyposis of stomach 03/17/2017    GERD (gastroesophageal reflux disease)     manage with med    Hyperlipidemia     Hypertension     managed with med    Sleep apnea     Sleeps with a CPAP      Past Surgical History:   Procedure Laterality Date    CHEST WALL RESECTION Left 7/20/2022    LEFT CHEST WALL AND RIGHT ANTERIOR THIGH MASS EXCISION WITH FROZEN SECTION performed by Cornelia White DO at [address] [facility]    ORTHOPEDIC SURGERY Right 2005    ankle surgery OTHER SURGICAL HISTORY Left 1994    cancer removal from left upper abdomen    US BREAST NEEDLE BIOPSY LEFT Left 05/13/2022    US BREAST NEEDLE BIOPSY LEFT 5/13/2022 [facility]        Family and Social History:  Family History   Problem Relation Age of Onset    Cancer Mother         breast     Social History     Socioeconomic History    Marital status:      Spouse name: Not on file    Number of children: Not on file    Years of education: Not on file    Highest education level: Not on file   Occupational History    Not on file   Tobacco Use    Smoking status: Never    Smokeless tobacco: Never   Vaping Use    Vaping Use: Never used   Substance and Sexual Activity    Alcohol use: Yes     Comment: rarely    Drug use: No    Sexual activity: Not on file   Other Topics Concern    Not on file   Social History Narrative    Not on file     Social Determinants of Health     Financial Resource Strain: Not on file   Food Insecurity: Not on file   Transportation Needs: Not on file   Physical Activity: Not on file   Stress: Not on file   Social Connections: Not on file   Intimate Partner Violence: Not on file   Housing Stability: Not on file        REVIEW OF SYSTEMS: 10 Point ROS negative except what is in HPI    PHYSICAL EXAMINATION:    There were no vitals taken for this visit. General Appearance AOx3, in no acute distress  Respiratory No chest wall deformities or tenderness, respiratory effort normal, no use of accessory muscles. Cardiovascular RRR. No chest wall tenderness. Gastrointestinal Abdomen soft, nontender, nondistended. BS x4. No rebound, guarding, or rigidity present. No palpable masses. No CVA tenderness   Incisions: healing appropriately     DIAGNOSIS        A:  \"LEFT CHEST WALL\":  ROSAI-MEGGAN DISEASE. SEE COMMENT. B:  \"DERMATOFIBROSARCOMA RIGHT THIGH MASS\":  ROSAI-MEGGAN DISEASE. SEE COMMENT.      Assessment:  Left chest wall mass, right thigh mass    Plan:  Pt overall doing well.  Discussed her pathology with her. She is to see oncology in the next couple of weeks. I will have a discussion with them in regards to her margins and whether or not we need to take more tissue. Continue to slowly increase their activities of daily living. No concerns at this time.

## 2022-08-12 ENCOUNTER — OFFICE VISIT (OUTPATIENT)
Dept: FAMILY MEDICINE CLINIC | Facility: CLINIC | Age: 62
End: 2022-08-12
Payer: COMMERCIAL

## 2022-08-12 VITALS
OXYGEN SATURATION: 97 % | WEIGHT: 277.2 LBS | SYSTOLIC BLOOD PRESSURE: 120 MMHG | HEIGHT: 68 IN | DIASTOLIC BLOOD PRESSURE: 80 MMHG | HEART RATE: 72 BPM | TEMPERATURE: 97.9 F | BODY MASS INDEX: 42.01 KG/M2

## 2022-08-12 DIAGNOSIS — I10 PRIMARY HYPERTENSION: ICD-10-CM

## 2022-08-12 DIAGNOSIS — I10 PRIMARY HYPERTENSION: Primary | ICD-10-CM

## 2022-08-12 DIAGNOSIS — K21.9 GASTROESOPHAGEAL REFLUX DISEASE WITHOUT ESOPHAGITIS: ICD-10-CM

## 2022-08-12 DIAGNOSIS — E11.9 CONTROLLED TYPE 2 DIABETES MELLITUS WITHOUT COMPLICATION, WITHOUT LONG-TERM CURRENT USE OF INSULIN (HCC): ICD-10-CM

## 2022-08-12 DIAGNOSIS — Z79.899 MEDICATION MANAGEMENT: ICD-10-CM

## 2022-08-12 DIAGNOSIS — E66.01 OBESITY, MORBID (HCC): ICD-10-CM

## 2022-08-12 DIAGNOSIS — G47.33 OSA (OBSTRUCTIVE SLEEP APNEA): ICD-10-CM

## 2022-08-12 PROBLEM — R31.9 URINARY TRACT INFECTION WITH HEMATURIA: Status: RESOLVED | Noted: 2022-02-18 | Resolved: 2022-08-12

## 2022-08-12 PROBLEM — D64.9 ANEMIA: Status: ACTIVE | Noted: 2017-02-14

## 2022-08-12 PROBLEM — R30.0 DYSURIA: Status: RESOLVED | Noted: 2022-02-18 | Resolved: 2022-08-12

## 2022-08-12 PROBLEM — N39.0 URINARY TRACT INFECTION WITH HEMATURIA: Status: RESOLVED | Noted: 2022-02-18 | Resolved: 2022-08-12

## 2022-08-12 LAB
ANION GAP SERPL CALC-SCNC: 6 MMOL/L (ref 7–16)
BUN SERPL-MCNC: 13 MG/DL (ref 8–23)
CALCIUM SERPL-MCNC: 9.6 MG/DL (ref 8.3–10.4)
CHLORIDE SERPL-SCNC: 105 MMOL/L (ref 98–107)
CO2 SERPL-SCNC: 26 MMOL/L (ref 21–32)
CREAT SERPL-MCNC: 0.7 MG/DL (ref 0.6–1)
GLUCOSE SERPL-MCNC: 107 MG/DL (ref 65–100)
HBA1C MFR BLD: 7 %
POTASSIUM SERPL-SCNC: 4 MMOL/L (ref 3.5–5.1)
SODIUM SERPL-SCNC: 137 MMOL/L (ref 136–145)

## 2022-08-12 PROCEDURE — 99213 OFFICE O/P EST LOW 20 MIN: CPT | Performed by: NURSE PRACTITIONER

## 2022-08-12 PROCEDURE — 83036 HEMOGLOBIN GLYCOSYLATED A1C: CPT | Performed by: NURSE PRACTITIONER

## 2022-08-12 RX ORDER — ATORVASTATIN CALCIUM 40 MG/1
40 TABLET, FILM COATED ORAL DAILY
Qty: 30 TABLET | Refills: 5 | Status: SHIPPED | OUTPATIENT
Start: 2022-08-12

## 2022-08-12 RX ORDER — LISINOPRIL AND HYDROCHLOROTHIAZIDE 25; 20 MG/1; MG/1
0.5 TABLET ORAL EVERY EVENING
Qty: 30 TABLET | Refills: 2 | Status: SHIPPED | OUTPATIENT
Start: 2022-08-12

## 2022-08-12 RX ORDER — OMEPRAZOLE 20 MG/1
20 CAPSULE, DELAYED RELEASE ORAL DAILY
Qty: 30 CAPSULE | Refills: 5 | Status: SHIPPED | OUTPATIENT
Start: 2022-08-12

## 2022-08-12 ASSESSMENT — ENCOUNTER SYMPTOMS
WHEEZING: 0
CONSTIPATION: 0
DIARRHEA: 0
BLOOD IN STOOL: 0
NAUSEA: 0
SHORTNESS OF BREATH: 0
ABDOMINAL PAIN: 0

## 2022-08-12 ASSESSMENT — PATIENT HEALTH QUESTIONNAIRE - PHQ9
SUM OF ALL RESPONSES TO PHQ QUESTIONS 1-9: 0
1. LITTLE INTEREST OR PLEASURE IN DOING THINGS: 0
SUM OF ALL RESPONSES TO PHQ9 QUESTIONS 1 & 2: 0
SUM OF ALL RESPONSES TO PHQ QUESTIONS 1-9: 0
2. FEELING DOWN, DEPRESSED OR HOPELESS: 0

## 2022-08-12 NOTE — ASSESSMENT & PLAN NOTE
Uncontrolled, continue current treatment plan, lifestyle modifications recommended and Patient has not been exercising as regularly due to recent surgery. Patient encouraged to exercise regularly and eat healthy diet.

## 2022-08-12 NOTE — ASSESSMENT & PLAN NOTE
Borderline controlled, continue current medications, continue current treatment plan, medication adherence emphasized, lifestyle modifications recommended and pt motivated to increase exercise and dietary compliance; declines diabetic education. She reports she has had previous education. Patient prefers to hold off on adding another agent feels like she can get her A1c back down. Her A1c is at 7. Patient reports compliance with her metformin. Discussed with patient risks of uncontrolled diabetes such as heart attack, stroke, amputation, among others. Patient is up-to-date on her eye exam and follows regularly.

## 2022-08-15 ASSESSMENT — ENCOUNTER SYMPTOMS
NAUSEA: 0
VOICE CHANGE: 0
BLOOD IN STOOL: 0
SHORTNESS OF BREATH: 0
HEMOPTYSIS: 0
ABDOMINAL DISTENTION: 0
SORE THROAT: 0
CHEST TIGHTNESS: 0
VOMITING: 0
WHEEZING: 0
SCLERAL ICTERUS: 0
DIARRHEA: 0
TROUBLE SWALLOWING: 0
ABDOMINAL PAIN: 0
CONSTIPATION: 0

## 2022-08-15 NOTE — PROGRESS NOTES
[employer] [facility] On[facility]: Established patient - follow up     Chief Complaint   Patient presents with    Follow-up     Reason for Referral: Abnormal breast biopsy; Mass of axillary tail of left breast; History of skin cancer; Family history of breast cancer in mother  Referring Provider: SHIRLEY Templeton CNP  Family History of Cancer/Hematologic Disorders: Family history is significant for mother with breast cancer. Presenting Symptoms: Lump in left breast axillary tail    Diagnosis: Rosai-Paris Disease     History of Present Illness:  Ms. Aydne Hernandez is a 63 y.o. female who presents today for follow up regarding recent mass in the axillary breast tail. 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  On 4/8/22 she presented to her PCPs office with complaint of left breast lump. Physical exam confirmed a firm and non-tender left breast mass measuring approximately 1 cm in the left upper outer breast region along the tail of breast tissue. Further evaluation with bilateral diagnostic digital mammogram with tomosynthesis and left breast ultrasound on 4/8/22 identified a 1.1 cm hypoechoic lesion in the left axillary tail that initially appeared inflammatory, possibly a small abscess. Treatment with antibiotics and follow-up ultrasound in 3-4 weeks to document resolution was recommended. Patient was treated with antibiotics and repeat left breast ultrasound was performed on 5/6/22. US showed a persistent masslike area in the left axillary tail which was still thought to most likely be inflammatory but was unimproved with antibiotic treatment, and ultrasound guided biopsy/aspiration was recommended to exclude an atypical tumor or infection.   Recommended ultrasound-guided biopsy of the left axillary tail mass was completed on 5/13/22 with pathology revealing tissue consistent with needle biopsy of lymph node having foamy histiocytes in association with acute inflammation, nondiagnostic for metastatic tumor. Pathologist noted, While metastatic malignant tumor is not identified in this material, unsampled lymph node could have such findings. While intact architecture is most consistent with reactive lymph node, focal involvement of lymph node by low grade lymphoma cannot be ruled out on H&E morphology alone.   Patient was consequently recommended 6-month follow-up. She returned to her PCP on 6/17/22 to discuss her breast biopsy results. She reported being recommended 6-month follow-up, but she was concerned with her history of cancer and requested to be evaluated sooner. PCP noted that patient believes the breast lump is getting larger. Referral was placed to [facility] for Medical Oncology evaluation and treatment recommendations. Daughter lives in [place] - has ET and sees heme/onc at [facility]. At consultation, we discussed her recent imaging and pathology in detail. Pt feels the mass has grown some since bx. She has a hx of left breast excision - years ago - benign findings. 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 She stated that this required 3 surgeries to obtain clear margins. No LN sampling at that time. After MR imaging sx was recommended. Path from 5/13 bx c/w lymph node having foamy histiocytes associated with acute inflammation - non-malignant. In the interim, pt also underwent bx of skin lesion and path is somewhat inconclusive 5/16: Neoplasm of uncertain behavior versus BCC versus irritated nevus. I have discussed pt's case at tumor board and recommend excision with good margins.     Today, she is here for FU after surgery - resection of breast mass and also thigh lesion - both of which showed activity on PET and were resected with suspicion of sarcoma. 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 She reviewed the path with sx and further today with me. We discussed rarity of disease and possible further workup which can include further labs to eval for autoimmune dz. She has no fam hx of rare diseases. After further review, she would like for the tissue to be send for external review - will start at [place] [id number]. This was discussed with Dr Shannon Mena by me today. Chronological Events:   BILATERAL DIAGNOSTIC DIGITAL MAMMOGRAM WITH TOMOSYNTHESIS AND LEFT BREAST ULTRASOUND 4/8/22  FINDINGS: There is an area of ill-defined increased density in the left axillary tail which corresponds to the palpable abnormality. This is superior to prior lumpectomy site. No other masslike areas are seen in either breast.    LEFT BREAST ULTRASOUND: There is a 1.1 cm hypoechoic lesion in the left axillary tail. The surrounding tissue is echogenic. No other masslike areas are seen. IMPRESSION: Left axilla tail lesion appears inflammatory, possibly a small abscess. Suggest treatment with antibiotics. Follow-up ultrasound in 3-4 weeks to document resolution. BI-RADS Assessment Category 3: Probably benign- Short-interval follow-up  suggested. (#BRad3)     LEFT BREAST ULTRASOUND 5/6/22  FINDINGS: Ultrasound of the upper outer left breast shows a persistent hyperechoic area with central hypoechoic tissue, possibly fluid. The central hypoechoic area measures 1.1 cm. The larger hyperechoic region measures 3.6 x 1.6 cm. There is no significant change in appearance compared with the prior  ultrasound from 04/08/2022. IMPRESSION: Persistent masslike area in the left axillary tail. Most likely inflammatory, but it has not improved with antibiotic treatment. Ultrasound guided biopsy/aspiration is recommended to exclude an atypical tumor or infection. BI-RADS Assessment Category 4: Suspicious Finding- Biopsy should be considered.  (#BRad4)     STF throat, trouble swallowing and voice change. Eyes:  Negative for icterus. Respiratory:  Negative for chest tightness, hemoptysis, shortness of breath and wheezing. Cardiovascular:  Negative for chest pain, leg swelling and palpitations. Gastrointestinal:  Negative for abdominal distention, abdominal pain, blood in stool, constipation, diarrhea, nausea and vomiting. Endocrine: Negative for hot flashes. Genitourinary:  Negative for difficulty urinating, frequency, vaginal bleeding and vaginal discharge. Musculoskeletal:  Negative for arthralgias, flank pain, gait problem and myalgias. Skin:  Negative for itching, rash and wound. Neurological:  Negative for dizziness, extremity weakness, gait problem, headaches and numbness. Psychiatric/Behavioral:  Negative for confusion and depression. The patient is nervous/anxious.        No Known Allergies  Past Medical History:   Diagnosis Date    Adenomatous polyp of colon 03/17/2017    Cancer ([facility] [address] 1993    skin; Dermatofibro sarcoma in the left upper abdomen    Diabetes ([facility] [address]     oral reliant; AVG ; pt denies s.s. of hypoglycemia; last A1C    Dysuria 2/18/2022    Fundic gland polyposis of stomach 03/17/2017    GERD (gastroesophageal reflux disease)     manage with med    Hyperlipidemia     Hypertension     managed with med    Sleep apnea     Sleeps with a CPAP    Urinary tract infection with hematuria 2/18/2022     Past Surgical History:   Procedure Laterality Date    CHEST WALL RESECTION Left 7/20/2022    LEFT CHEST WALL AND RIGHT ANTERIOR THIGH MASS EXCISION WITH FROZEN SECTION performed by Naomi Barone DO at [facility]    ORTHOPEDIC SURGERY Right 2005    ankle surgery    OTHER SURGICAL HISTORY Left 1994    cancer removal from left upper abdomen    US BREAST NEEDLE BIOPSY LEFT Left 05/13/2022    US BREAST NEEDLE BIOPSY LEFT 5/13/2022 [facility]     Current Outpatient Medications Medication Sig Dispense Refill    atorvastatin (LIPITOR) 40 MG tablet Take 1 tablet by mouth in the morning. 30 tablet 5    lisinopril-hydroCHLOROthiazide (PRINZIDE;ZESTORETIC) 20-25 MG per tablet Take 0.5 tablets by mouth every evening 30 tablet 2    metFORMIN (GLUCOPHAGE) 1000 MG tablet Take 1 tablet by mouth in the morning and 1 tablet in the evening. Take with meals. TAKE 1 TABLET BY MOUTH TWICE A DAY WITH MEALS. 60 tablet 5    omeprazole (PRILOSEC) 20 MG delayed release capsule Take 1 capsule by mouth in the morning. 30 capsule 5    Multiple Vitamins-Minerals (THERAPEUTIC MULTIVITAMIN-MINERALS) tablet Take 1 tablet by mouth in the morning. aspirin 81 MG EC tablet Take 81 mg by mouth every evening Preventative Only-pt denies heart attack, stroke, blood clots and stents      docusate sodium (COLACE) 100 MG capsule Take 1 capsule by mouth in the morning and 1 capsule before bedtime. (Patient not taking: No sig reported) 60 capsule 0     No current facility-administered medications for this visit. No flowsheet data found. OBJECTIVE:  /74   Pulse 78   Temp 97.9 °F (36.6 °C)   Resp 16   Ht 5' 8\" (1.727 m)   Wt 276 lb 9.6 oz (125.5 kg)   SpO2 96%   BMI 42.06 kg/m²   Pt anxious     ECOG PERFORMANCE STATUS - 1- Restricted in physically strenuous activity but ambulatory and able to carry out work of a light or sedentary nature such as light house work, office work. Pain - /10. None/Minimal pain - not affecting QOL     Fatigue - No flowsheet data found. Distress - No flowsheet data found. Physical Exam  Vitals reviewed. Exam conducted with a chaperone present. Constitutional:       General: She is not in acute distress. Appearance: Normal appearance. She is not ill-appearing or toxic-appearing. HENT:      Head: Normocephalic and atraumatic. Nose: Nose normal.      Mouth/Throat:      Mouth: Mucous membranes are moist.   Eyes:      General: No scleral icterus. Extraocular Movements: Extraocular movements intact. Conjunctiva/sclera: Conjunctivae normal.      Pupils: Pupils are equal, round, and reactive to light. Cardiovascular:      Rate and Rhythm: Normal rate and regular rhythm. Heart sounds: No murmur heard. Pulmonary:      Effort: Pulmonary effort is normal. No respiratory distress. Breath sounds: Normal breath sounds. No wheezing or rales. Chest:   Breasts:     Right: No axillary adenopathy or supraclavicular adenopathy. Left: No axillary adenopathy or supraclavicular adenopathy. Comments: Left breast axillary mass resected - no evid of recurrence, healing very nicely, no erythema   No other mass/lump bilaterally   No axillary LAD   Scar over left breast from previous resection   Abdominal:      General: There is no distension. Musculoskeletal:         General: Normal range of motion. Cervical back: Normal range of motion. Right lower leg: No edema. Left lower leg: No edema. Lymphadenopathy:      Cervical: No cervical adenopathy. Upper Body:      Right upper body: No supraclavicular or axillary adenopathy. Left upper body: No supraclavicular or axillary adenopathy. Skin:     General: Skin is warm and dry. Coloration: Skin is not jaundiced or pale. Findings: Lesion (LE thigh scar - CDI, healing well) present. No rash. Neurological:      General: No focal deficit present. Mental Status: She is alert and oriented to person, place, and time. Gait: Gait normal.   Psychiatric:         Behavior: Behavior normal.         Thought Content:  Thought content normal.        Labs:  Recent Results (from the past 168 hour(s))   Basic Metabolic Panel    Collection Time: 08/12/22  8:47 AM   Result Value Ref Range    Sodium 137 136 - 145 mmol/L    Potassium 4.0 3.5 - 5.1 mmol/L    Chloride 105 98 - 107 mmol/L    CO2 26 21 - 32 mmol/L    Anion Gap 6 (L) 7 - 16 mmol/L    Glucose 107 (H) 65 - 100 mg/dL BUN 13 8 - 23 MG/DL    Creatinine 0.70 0.6 - 1.0 MG/DL    GFR African American >60 >60 ml/min/1.73m2    GFR Non- >60 >60 ml/min/1.73m2    Calcium 9.6 8.3 - 10.4 MG/DL   AMB POC HEMOGLOBIN A1C    Collection Time: 08/12/22  8:50 AM   Result Value Ref Range    Hemoglobin A1C, POC 7.0 %   Lactate Dehydrogenase    Collection Time: 08/17/22  9:43 AM   Result Value Ref Range     100 - 190 U/L   Uric Acid    Collection Time: 08/17/22  9:43 AM   Result Value Ref Range    Uric Acid 5.6 2.6 - 6.0 MG/DL   Rheumatoid Factor    Collection Time: 08/17/22  9:43 AM   Result Value Ref Range    Rheumatoid Factor Positive (A) NEG     Sedimentation Rate    Collection Time: 08/17/22  9:43 AM   Result Value Ref Range    Sed Rate, Automated 35 (H) 0 - 30 mm/hr   Comprehensive Metabolic Panel    Collection Time: 08/17/22  9:43 AM   Result Value Ref Range    Sodium 139 136 - 145 mmol/L    Potassium 3.7 3.5 - 5.1 mmol/L    Chloride 104 98 - 107 mmol/L    CO2 28 21 - 32 mmol/L    Anion Gap 7 7 - 16 mmol/L    Glucose 103 (H) 65 - 100 mg/dL    BUN 9 8 - 23 MG/DL    Creatinine 0.70 0.6 - 1.0 MG/DL    GFR African American >60 >60 ml/min/1.73m2    GFR Non- >60 >60 ml/min/1.73m2    Calcium 9.2 8.3 - 10.4 MG/DL    Total Bilirubin 0.4 0.2 - 1.1 MG/DL    ALT 50 12 - 65 U/L    AST 22 15 - 37 U/L    Alk Phosphatase 99 50 - 136 U/L    Total Protein 8.2 6.3 - 8.2 g/dL    Albumin 3.8 3.2 - 4.6 g/dL    Globulin 4.4 (H) 2.3 - 3.5 g/dL    Albumin/Globulin Ratio 0.9 (L) 1.2 - 3.5     Rheumatoid Factor, Qt    Collection Time: 08/17/22  9:43 AM   Result Value Ref Range    Rheumatoid Factor 1:128 NEG       Imaging: reviewed     PATHOLOGY:         7/2022        ASSESSMENT:     Diagnosis Orders   1.  Rosai-Paris disease (HCC)  Lactate Dehydrogenase    Uric Acid    Antinuclear AB Screen IFA    Rheumatoid Factor    Sedimentation Rate    IgG, IgA, IgM    MRI BRAIN W WO CONTRAST    Comprehensive Metabolic Panel    MRI ORBITS 33 On license of [facility]    Pathology Specimen To Lab          Ms. Itzel Carlton is here for evaluation of left breast mass. 1. Left axillary tail breat mass - ~2-3cm on examination - path c/w rosai-parish disease   2. Thigh lesion - FDG avid - s/p resection - c/w RDD     - today, she is here for FU; we reviewed results from surgical resection and new dx of RDD and it's pathophysiology  - plan to send tissue for outside review at [place] [id number] per pt's request   - utilizing [place] review - will p/w additional labs and also MR brain/orbits; no need for [facility] testing as pt does not think this is familial.    - consider PDGFR alpha/beta and c-kit testing on tissue.     - we discussed s/s to monitor and report if/when they occur   - if developed DROM - can refer to onc rehab - pt aware and to call us if needed       RESUSCITATION DIRECTIVES/HOSPICE CARE: Full Support    RTC for VV re labs/imaging and in 1-2mo or sooner as needed   [40min - chart review, visit, discussion, review of images, coordination of care, discussion with other providers and charting]    MDM  Number of Diagnoses or Management Options  Abnormal MRI, breast: established, improving  Chest wall mass: established, improving  History of sarcoma: established, improving  Mass of axillary tail of left breast: established, improving  Personal history of sarcoma of soft tissue: established, improving  Rosai-Parish disease ([facility] [address]): new, needed workup     Amount and/or Complexity of Data Reviewed  Clinical lab tests: ordered and reviewed  Tests in the radiology section of CPT®: ordered and reviewed  Obtain history from someone other than the patient: yes  Review and summarize past medical records: yes  Discuss the patient with other providers: yes (Dr Liyah Alford)  Independent visualization of images, tracings, or specimens: yes    Risk of Complications, Morbidity, and/or Mortality  Presenting problems: moderate  Diagnostic procedures: moderate  Management options: high        Lab studies and imaging studies were personally reviewed. Pertinent old records were reviewed. Historical:   - new dermal lesion over her right medial breast - somewhat violacoious in color - I think this should be considered for bx, considering her hx. Appears different from her other cherry angiomas. She agrees with the plan and will call her dermatologist.     - refer to surgery - Dr Mariana Lazo; will plan on tumor board discussion   - we discussed her recent imaging and pathology in detail. Pt feels the mass has grown since bx. She has a hx of left breast excision - years ago - benign findings. 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 She stated that this required 3 surgeries to obtain clear margins. No LN sampling at that time. - s/p MRI - images reviewed and growth noted; case d/w tumor board - recommend excision of breast tail lesion with generous margins and also resection of biopsied right breast area; I have discussed pt's case with Dr Mariana Lazo. - will get PET as soon as possible - will likely have results before sx date    - pt ok with us sending final path for outside review to academic center; also ok with me reviewing her case with [facility] expert once we have pathology    All questions were asked and answered to the best of my ability. The patient verbalized understanding and agrees with the plan above.             [facility] Percy Reyes) Cornelio Ward, [address] [place] [address] Hematology and Oncology  [address] [place] [address]  Office : [phone]  Fax : [phone]

## 2022-08-17 ENCOUNTER — HOSPITAL ENCOUNTER (OUTPATIENT)
Dept: LAB | Age: 62
Discharge: HOME | End: 2022-08-20
Payer: COMMERCIAL

## 2022-08-17 ENCOUNTER — OFFICE VISIT (OUTPATIENT)
Dept: ONCOLOGY | Age: 62
End: 2022-08-17
Payer: COMMERCIAL

## 2022-08-17 VITALS
OXYGEN SATURATION: 96 % | WEIGHT: 276.6 LBS | HEART RATE: 78 BPM | DIASTOLIC BLOOD PRESSURE: 74 MMHG | SYSTOLIC BLOOD PRESSURE: 139 MMHG | RESPIRATION RATE: 16 BRPM | HEIGHT: 68 IN | BODY MASS INDEX: 41.92 KG/M2 | TEMPERATURE: 97.9 F

## 2022-08-17 DIAGNOSIS — Z85.831 HISTORY OF SARCOMA: ICD-10-CM

## 2022-08-17 DIAGNOSIS — R92.8 ABNORMAL MRI, BREAST: ICD-10-CM

## 2022-08-17 DIAGNOSIS — Z85.831 PERSONAL HISTORY OF SARCOMA OF SOFT TISSUE: ICD-10-CM

## 2022-08-17 DIAGNOSIS — D76.3 ROSAI-DORFMAN DISEASE (HCC): Primary | ICD-10-CM

## 2022-08-17 DIAGNOSIS — N63.32 MASS OF AXILLARY TAIL OF LEFT BREAST: ICD-10-CM

## 2022-08-17 DIAGNOSIS — D76.3 ROSAI-DORFMAN DISEASE (HCC): ICD-10-CM

## 2022-08-17 DIAGNOSIS — R22.2 CHEST WALL MASS: ICD-10-CM

## 2022-08-17 LAB
ALBUMIN SERPL-MCNC: 3.8 G/DL (ref 3.2–4.6)
ALBUMIN/GLOB SERPL: 0.9 {RATIO} (ref 1.2–3.5)
ALP SERPL-CCNC: 99 U/L (ref 50–136)
ALT SERPL-CCNC: 50 U/L (ref 12–65)
ANION GAP SERPL CALC-SCNC: 7 MMOL/L (ref 7–16)
AST SERPL-CCNC: 22 U/L (ref 15–37)
BILIRUB SERPL-MCNC: 0.4 MG/DL (ref 0.2–1.1)
BUN SERPL-MCNC: 9 MG/DL (ref 8–23)
CALCIUM SERPL-MCNC: 9.2 MG/DL (ref 8.3–10.4)
CHLORIDE SERPL-SCNC: 104 MMOL/L (ref 98–107)
CO2 SERPL-SCNC: 28 MMOL/L (ref 21–32)
CREAT SERPL-MCNC: 0.7 MG/DL (ref 0.6–1)
ERYTHROCYTE [SEDIMENTATION RATE] IN BLOOD: 35 MM/HR (ref 0–30)
GLOBULIN SER CALC-MCNC: 4.4 G/DL (ref 2.3–3.5)
GLUCOSE SERPL-MCNC: 103 MG/DL (ref 65–100)
LDH SERPL L TO P-CCNC: 138 U/L (ref 100–190)
POTASSIUM SERPL-SCNC: 3.7 MMOL/L (ref 3.5–5.1)
PROT SERPL-MCNC: 8.2 G/DL (ref 6.3–8.2)
RHEUMATOID FACT SER QL LA: POSITIVE
RHEUMATOID FACT TITR SER LA: NORMAL {TITER}
SODIUM SERPL-SCNC: 139 MMOL/L (ref 136–145)
URATE SERPL-MCNC: 5.6 MG/DL (ref 2.6–6)

## 2022-08-17 PROCEDURE — 82784 ASSAY IGA/IGD/IGG/IGM EACH: CPT

## 2022-08-17 PROCEDURE — 86430 RHEUMATOID FACTOR TEST QUAL: CPT

## 2022-08-17 PROCEDURE — 83615 LACTATE (LD) (LDH) ENZYME: CPT

## 2022-08-17 PROCEDURE — 84550 ASSAY OF BLOOD/URIC ACID: CPT

## 2022-08-17 PROCEDURE — 36415 COLL VENOUS BLD VENIPUNCTURE: CPT

## 2022-08-17 PROCEDURE — 86038 ANTINUCLEAR ANTIBODIES: CPT

## 2022-08-17 PROCEDURE — 80053 COMPREHEN METABOLIC PANEL: CPT

## 2022-08-17 PROCEDURE — 85652 RBC SED RATE AUTOMATED: CPT

## 2022-08-17 PROCEDURE — 99215 OFFICE O/P EST HI 40 MIN: CPT | Performed by: INTERNAL MEDICINE

## 2022-08-17 PROCEDURE — 86431 RHEUMATOID FACTOR QUANT: CPT

## 2022-08-17 ASSESSMENT — PATIENT HEALTH QUESTIONNAIRE - PHQ9
4. FEELING TIRED OR HAVING LITTLE ENERGY: 0
SUM OF ALL RESPONSES TO PHQ QUESTIONS 1-9: 0
3. TROUBLE FALLING OR STAYING ASLEEP: 0
9. THOUGHTS THAT YOU WOULD BE BETTER OFF DEAD, OR OF HURTING YOURSELF: 0
7. TROUBLE CONCENTRATING ON THINGS, SUCH AS READING THE NEWSPAPER OR WATCHING TELEVISION: 0
1. LITTLE INTEREST OR PLEASURE IN DOING THINGS: 0
2. FEELING DOWN, DEPRESSED OR HOPELESS: 0
8. MOVING OR SPEAKING SO SLOWLY THAT OTHER PEOPLE COULD HAVE NOTICED. OR THE OPPOSITE, BEING SO FIGETY OR RESTLESS THAT YOU HAVE BEEN MOVING AROUND A LOT MORE THAN USUAL: 0
SUM OF ALL RESPONSES TO PHQ QUESTIONS 1-9: 0
5. POOR APPETITE OR OVEREATING: 0
SUM OF ALL RESPONSES TO PHQ9 QUESTIONS 1 & 2: 0
SUM OF ALL RESPONSES TO PHQ QUESTIONS 1-9: 0
SUM OF ALL RESPONSES TO PHQ QUESTIONS 1-9: 0
6. FEELING BAD ABOUT YOURSELF - OR THAT YOU ARE A FAILURE OR HAVE LET YOURSELF OR YOUR FAMILY DOWN: 0

## 2022-08-17 ASSESSMENT — ANXIETY QUESTIONNAIRES
6. BECOMING EASILY ANNOYED OR IRRITABLE: 0
1. FEELING NERVOUS, ANXIOUS, OR ON EDGE: 0
4. TROUBLE RELAXING: 0
IF YOU CHECKED OFF ANY PROBLEMS ON THIS QUESTIONNAIRE, HOW DIFFICULT HAVE THESE PROBLEMS MADE IT FOR YOU TO DO YOUR WORK, TAKE CARE OF THINGS AT HOME, OR GET ALONG WITH OTHER PEOPLE: NOT DIFFICULT AT ALL
2. NOT BEING ABLE TO STOP OR CONTROL WORRYING: 0
3. WORRYING TOO MUCH ABOUT DIFFERENT THINGS: 0
5. BEING SO RESTLESS THAT IT IS HARD TO SIT STILL: 0
GAD7 TOTAL SCORE: 0
7. FEELING AFRAID AS IF SOMETHING AWFUL MIGHT HAPPEN: 0

## 2022-08-17 NOTE — PATIENT INSTRUCTIONS
Patient Instructions from Today's Visit    Reason for Visit:  Follow up visit      Plan:    Pathology showed you have Rosai-Paris disease. This is a rare disease but is good news!! We will send your pathology sample to [facility]   We would still like to do some lab work to rule out anything autoimmune. We also would like to do an MRI of your brain and orbits before the end of the year. We can see you in 6 months-1 year but you call us if you have any unexplained fevers, unexplained weight loss, extreme fatigue, an infection that won't go away, new lumps or bumps. Let us know if you have limited range of motion from the surgery and we can refer you to our PT, Kaye. You can apply bio-oil to the scars to soften the tissue. You can go to the [employer] website [address]) for more information on Rosai-Paris. Call us in 1-2 months regarding the [facility] testing. Follow Up:  - VV next week in the next couple of weeks to discuss lab and MRI results    Recent Lab Results:      Treatment Summary has been discussed and given to patient: n/a        -------------------------------------------------------------------------------------------------------------------  Please call our office at [phone] if you have any  of the following symptoms:   Fever of 100.5 or greater  Chills  Shortness of breath  Swelling or pain in one leg    After office hours an answering service is available and will contact a provider for emergencies or if you are experiencing any of the above symptoms. Patient did express an interest in My Chart. My Chart log in information explained on the after visit summary printout at the [facility] [address] desk.     Liz Casiano RN

## 2022-08-18 ENCOUNTER — CLINICAL DOCUMENTATION (OUTPATIENT)
Dept: ONCOLOGY | Age: 62
End: 2022-08-18

## 2022-08-18 DIAGNOSIS — D76.3 ROSAI-DORFMAN DISEASE (HCC): Primary | ICD-10-CM

## 2022-08-18 LAB
IGA SERPL-MCNC: 194 MG/DL (ref 87–352)
IGG SERPL-MCNC: 949 MG/DL (ref 586–1602)
IGM SERPL-MCNC: 261 MG/DL (ref 26–217)

## 2022-08-19 ENCOUNTER — TELEPHONE (OUTPATIENT)
Dept: ONCOLOGY | Age: 62
End: 2022-08-19

## 2022-08-19 NOTE — TELEPHONE ENCOUNTER
Question: was here Wed, Dr Kaylynn Orlando mentioned her getting an MRI of brain/orbits, the  actually scheduled for orbits, face, neck. Schedule appt doesn't say \"brain\" as Dr Kaylynn Orlando mentioned. Is it scheduled correctly? MRI is sched for Aug 25.

## 2022-08-19 NOTE — TELEPHONE ENCOUNTER
Call to Eden Gil in Radiology scheduling and she will call the patient and add the MRI Brain to the appt.

## 2022-08-24 ENCOUNTER — TELEPHONE (OUTPATIENT)
Dept: ONCOLOGY | Age: 62
End: 2022-08-24

## 2022-08-24 NOTE — TELEPHONE ENCOUNTER
Called pt to let her know pathology specimen reviewed by [facility] - in agreement with dx. [facility] has also requested additional tissue to run genetic testing. Pt thankful for call.

## 2022-08-25 LAB — ANA TITR SER IF: NEGATIVE {TITER}

## 2022-08-29 ENCOUNTER — HOSPITAL ENCOUNTER (OUTPATIENT)
Dept: MRI IMAGING | Age: 62
Discharge: HOME | End: 2022-09-01
Payer: COMMERCIAL

## 2022-08-29 DIAGNOSIS — D76.3 ROSAI-DORFMAN DISEASE (HCC): ICD-10-CM

## 2022-08-29 PROCEDURE — A9579 GAD-BASE MR CONTRAST NOS,1ML: HCPCS | Performed by: INTERNAL MEDICINE

## 2022-08-29 PROCEDURE — 6360000004 HC RX CONTRAST MEDICATION: Performed by: INTERNAL MEDICINE

## 2022-08-29 PROCEDURE — 2580000003 HC RX 258: Performed by: INTERNAL MEDICINE

## 2022-08-29 PROCEDURE — 70543 MRI ORBT/FAC/NCK W/O &W/DYE: CPT

## 2022-08-29 PROCEDURE — 70553 MRI BRAIN STEM W/O & W/DYE: CPT

## 2022-08-29 RX ORDER — SODIUM CHLORIDE 0.9 % (FLUSH) 0.9 %
10 SYRINGE (ML) INJECTION AS NEEDED
Status: DISCONTINUED | OUTPATIENT
Start: 2022-08-29 | End: 2022-09-02 | Stop reason: HOSPADM

## 2022-08-29 RX ADMIN — GADOTERIDOL 26 ML: 279.3 INJECTION, SOLUTION INTRAVENOUS at 20:14

## 2022-08-29 RX ADMIN — SODIUM CHLORIDE, PRESERVATIVE FREE 10 ML: 5 INJECTION INTRAVENOUS at 20:14

## 2022-08-31 ENCOUNTER — TELEMEDICINE (OUTPATIENT)
Dept: ONCOLOGY | Age: 62
End: 2022-08-31

## 2022-08-31 ENCOUNTER — TELEMEDICINE (OUTPATIENT)
Dept: ONCOLOGY | Age: 62
End: 2022-08-31
Payer: COMMERCIAL

## 2022-08-31 DIAGNOSIS — R77.1 ELEVATED SERUM GLOBULIN LEVEL: Primary | ICD-10-CM

## 2022-08-31 DIAGNOSIS — Z53.8 APPOINTMENT CANCELED BY HOSPITAL: Primary | ICD-10-CM

## 2022-08-31 PROCEDURE — 99212 OFFICE O/P EST SF 10 MIN: CPT | Performed by: INTERNAL MEDICINE

## 2022-08-31 ASSESSMENT — PATIENT HEALTH QUESTIONNAIRE - PHQ9
2. FEELING DOWN, DEPRESSED OR HOPELESS: 0
SUM OF ALL RESPONSES TO PHQ QUESTIONS 1-9: 0
SUM OF ALL RESPONSES TO PHQ QUESTIONS 1-9: 0
1. LITTLE INTEREST OR PLEASURE IN DOING THINGS: 0
SUM OF ALL RESPONSES TO PHQ9 QUESTIONS 1 & 2: 0
SUM OF ALL RESPONSES TO PHQ QUESTIONS 1-9: 0
SUM OF ALL RESPONSES TO PHQ QUESTIONS 1-9: 0

## 2022-09-06 DIAGNOSIS — R76.8 RHEUMATOID FACTOR POSITIVE: Primary | ICD-10-CM

## 2022-09-06 ASSESSMENT — ENCOUNTER SYMPTOMS
TROUBLE SWALLOWING: 0
BLOOD IN STOOL: 0
ABDOMINAL DISTENTION: 0
SORE THROAT: 0
ABDOMINAL PAIN: 0
NAUSEA: 0
CONSTIPATION: 0
VOMITING: 0
SCLERAL ICTERUS: 0
DIARRHEA: 0
CHEST TIGHTNESS: 0
VOICE CHANGE: 0
HEMOPTYSIS: 0
SHORTNESS OF BREATH: 0
WHEEZING: 0

## 2022-09-06 NOTE — RESULT ENCOUNTER NOTE
Probably not but I would have Optho eval patient. Make sure no swelling of the Optic nerve its self.

## 2022-09-06 NOTE — PROGRESS NOTES
Patient has requested an audio/video evaluation for the following concern(s):    Pt was evaluated through a synchronous (real-time) audio-video encounter. The patient (or guardian if applicable) is aware that this is a billable service, which includes applicable co-pays. This Virtual Visit was conducted with patient's (and/or legal guardian's) consent. The visit was conducted pursuant to the emergency declaration under the [facility], [address] [place] [address] and the [employer] Act. Patient identification was verified, and a caregiver was present when appropriate. The patient was located at Other: home  Provider was located at [facility] ([place] [address]): [address]   [id number] [facility],  [address]. Total time spent on this encounter: [facility] Hematology and Oncology: Established patient - follow up     No chief complaint on file. Reason for Referral: Abnormal breast biopsy; Mass of axillary tail of left breast; History of skin cancer; Family history of breast cancer in mother  Referring Provider: SHIRLEY Jeter CNP  Family History of Cancer/Hematologic Disorders: Family history is significant for mother with breast cancer. Presenting Symptoms: Lump in left breast axillary tail    Diagnosis: Rosai-Paris Disease     History of Present Illness:  Ms. Indiana Goncalves is a 63 y.o. female who presents today for follow up regarding recent mass in the axillary breast tail. 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  On 4/8/22 she presented to her PCPs office with complaint of left breast lump.   Physical exam confirmed a firm and non-tender left breast mass measuring approximately 1 cm in the left upper outer breast region along the tail of breast tissue. Further evaluation with bilateral diagnostic digital mammogram with tomosynthesis and left breast ultrasound on 4/8/22 identified a 1.1 cm hypoechoic lesion in the left axillary tail that initially appeared inflammatory, possibly a small abscess. Treatment with antibiotics and follow-up ultrasound in 3-4 weeks to document resolution was recommended. Patient was treated with antibiotics and repeat left breast ultrasound was performed on 5/6/22. US showed a persistent masslike area in the left axillary tail which was still thought to most likely be inflammatory but was unimproved with antibiotic treatment, and ultrasound guided biopsy/aspiration was recommended to exclude an atypical tumor or infection. Recommended ultrasound-guided biopsy of the left axillary tail mass was completed on 5/13/22 with pathology revealing tissue consistent with needle biopsy of lymph node having foamy histiocytes in association with acute inflammation, nondiagnostic for metastatic tumor. Pathologist noted, While metastatic malignant tumor is not identified in this material, unsampled lymph node could have such findings. While intact architecture is most consistent with reactive lymph node, focal involvement of lymph node by low grade lymphoma cannot be ruled out on H&E morphology alone.   Patient was consequently recommended 6-month follow-up. She returned to her PCP on 6/17/22 to discuss her breast biopsy results. She reported being recommended 6-month follow-up, but she was concerned with her history of cancer and requested to be evaluated sooner. PCP noted that patient believes the breast lump is getting larger. Referral was placed to [facility] for Medical Oncology evaluation and treatment recommendations. Daughter lives in [place] - has ET and sees heme/onc at [facility]. At consultation, we discussed her recent imaging and pathology in detail.   Pt feels the mass has grown some since bx. She has a hx of left breast excision - years ago - benign findings. 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 She stated that this required 3 surgeries to obtain clear margins. No LN sampling at that time. After MR imaging sx was recommended. Path from 5/13 bx c/w lymph node having foamy histiocytes associated with acute inflammation - non-malignant. In the interim, pt also underwent bx of skin lesion and path is somewhat inconclusive 5/16: Neoplasm of uncertain behavior versus BCC versus irritated nevus. I have discussed pt's case at tumor board and recommend excision with good margins. S/p resection of breast mass and also thigh lesion - both of which showed activity on PET and were resected with suspicion of sarcoma. 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 We discussed rarity of disease and possible further workup which can include further labs to eval for autoimmune dz. She has no fam hx of rare diseases. After further review, she wished for the tissue to be send for external review - will start at [place] [address]. This was discussed with Dr Edwar Newsome     Today, she is here for VV FU. [facility] path review in agreement with our path. Pt satisfied. LAbs reviewed. Due to increased globulin - will check MM labs. RF noted - sent note to Dr Maxi Holden and will be evaluated. Chronological Events:   BILATERAL DIAGNOSTIC DIGITAL MAMMOGRAM WITH TOMOSYNTHESIS AND LEFT BREAST ULTRASOUND 4/8/22  FINDINGS: There is an area of ill-defined increased density in the left axillary tail which corresponds to the palpable abnormality. This is superior to prior lumpectomy site. No other masslike areas are seen in either breast.    LEFT BREAST ULTRASOUND: There is a 1.1 cm hypoechoic lesion in the left axillary tail. The surrounding tissue is echogenic. No other masslike areas are seen.    IMPRESSION: Left axilla tail lesion appears inflammatory, thigh. Both lesions remain  concerning for malignancy. 7/20/22 sx - Dr Stafford Ing - resection of axillary tail lesion and thigh lesion. 8/17/22 FU after sx - path c/w Rosai-Paris disease not dermatofibrosarcoma; will send tissue to [facility] per pt's wishes   8/31/22 FU - [facility] path concurrent; referred to rheum; srinivasan additional labs     Family History   Problem Relation Age of Onset    Cancer Mother         breast      Social History     Socioeconomic History    Marital status:    Tobacco Use    Smoking status: Never    Smokeless tobacco: Never   Vaping Use    Vaping Use: Never used   Substance and Sexual Activity    Alcohol use: Yes     Comment: rarely    Drug use: No        Review of Systems   Constitutional:  Negative for appetite change, chills, diaphoresis, fatigue, fever and unexpected weight change. HENT:   Negative for hearing loss, mouth sores, nosebleeds, sore throat, trouble swallowing and voice change. Eyes:  Negative for icterus. Respiratory:  Negative for chest tightness, hemoptysis, shortness of breath and wheezing. Cardiovascular:  Negative for chest pain, leg swelling and palpitations. Gastrointestinal:  Negative for abdominal distention, abdominal pain, blood in stool, constipation, diarrhea, nausea and vomiting. Endocrine: Negative for hot flashes. Genitourinary:  Negative for difficulty urinating, frequency, vaginal bleeding and vaginal discharge. Musculoskeletal:  Negative for arthralgias, flank pain, gait problem and myalgias. Skin:  Negative for itching, rash and wound. Neurological:  Negative for dizziness, extremity weakness, gait problem, headaches and numbness. Psychiatric/Behavioral:  Negative for confusion and depression. The patient is nervous/anxious.        No Known Allergies  Past Medical History:   Diagnosis Date    Adenomatous polyp of colon 03/17/2017    Cancer ([address] 1993    skin; Dermatofibro sarcoma in the left upper abdomen    Diabetes ([address] oral reliant; AVG ; pt denies s.s. of hypoglycemia; last A1C    Dysuria 2/18/2022    Fundic gland polyposis of stomach 03/17/2017    GERD (gastroesophageal reflux disease)     manage with med    Hyperlipidemia     Hypertension     managed with med    Sleep apnea     Sleeps with a CPAP    Urinary tract infection with hematuria 2/18/2022     Past Surgical History:   Procedure Laterality Date    CHEST WALL RESECTION Left 7/20/2022    LEFT CHEST WALL AND RIGHT ANTERIOR THIGH MASS EXCISION WITH FROZEN SECTION performed by Sven Gupta DO at [facility]      wisdom teeth    ORTHOPEDIC SURGERY Right 2005    ankle surgery    OTHER SURGICAL HISTORY Left 1994    cancer removal from left upper abdomen    US BREAST NEEDLE BIOPSY LEFT Left 05/13/2022    US BREAST NEEDLE BIOPSY LEFT 5/13/2022 [facility]     Current Outpatient Medications   Medication Sig Dispense Refill    atorvastatin (LIPITOR) 40 MG tablet Take 1 tablet by mouth in the morning. 30 tablet 5    lisinopril-hydroCHLOROthiazide (PRINZIDE;ZESTORETIC) 20-25 MG per tablet Take 0.5 tablets by mouth every evening 30 tablet 2    metFORMIN (GLUCOPHAGE) 1000 MG tablet Take 1 tablet by mouth in the morning and 1 tablet in the evening. Take with meals. TAKE 1 TABLET BY MOUTH TWICE A DAY WITH MEALS. 60 tablet 5    omeprazole (PRILOSEC) 20 MG delayed release capsule Take 1 capsule by mouth in the morning. 30 capsule 5    Multiple Vitamins-Minerals (THERAPEUTIC MULTIVITAMIN-MINERALS) tablet Take 1 tablet by mouth in the morning. aspirin 81 MG EC tablet Take 81 mg by mouth every evening Preventative Only-pt denies heart attack, stroke, blood clots and stents       No current facility-administered medications for this visit. No flowsheet data found. OBJECTIVE:  There were no vitals taken for this visit.   Pt anxious     ECOG PERFORMANCE STATUS - 1- Restricted in physically strenuous activity but ambulatory and able to carry out work of a light or sedentary nature such as light house work, office work. Pain - /10. None/Minimal pain - not affecting QOL     Fatigue - No flowsheet data found. Distress - No flowsheet data found. Physical Exam  Constitutional:       General: She is not in acute distress. Appearance: Normal appearance. HENT:      Head: Normocephalic and atraumatic. Nose: Nose normal.   Eyes:      General: No scleral icterus. Extraocular Movements: Extraocular movements intact. Conjunctiva/sclera: Conjunctivae normal.   Pulmonary:      Effort: Pulmonary effort is normal. No respiratory distress. Skin:     Coloration: Skin is not jaundiced or pale. Findings: No rash. Neurological:      General: No focal deficit present. Mental Status: She is alert and oriented to person, place, and time. Psychiatric:         Behavior: Behavior normal.         Thought Content: Thought content normal.        Labs:  No results found for this or any previous visit (from the past 168 hour(s)). Imaging: reviewed     PATHOLOGY:         7/2022        ASSESSMENT:     Diagnosis Orders   1. Elevated serum globulin level  JAZZ and PE, Serum    Kappa/Lambda Quantitative Free Light Chains, Serum    JAZZ+Protein Electrophoresis, 24 Hour Urine            Ms. Susan Trujillo is here for FU  of left breast and thigh mass. 1. Left axillary tail breat mass - ~2-3cm on examination - path c/w rosai-parish disease (RDD)  2. Thigh lesion - FDG avid - s/p resection - c/w RDD     - today, she is here for FU; we reviewed results from surgical resection and new dx of RDD and it's pathophysiology  - [facility] path in agreement with RDD   - LAbs reviewed. Due to increased globulin - will check MM labs. RF noted - sent note to Dr Selena Stoner and will be evaluated. - MR brain/orbits reassuring  - no need for [id number] testing as pt does not think this is familial.    - consider PDGFR alpha/beta and c-kit testing on tissue. - we discussed s/s to monitor and report if/when they occur   - if developed DROM - can refer to onc rehab - pt aware and to call us if needed   - MRI brain - orbits - There are small nonspecific mildly prominent submandibular and jugulodigastric chain lymph nodes; non-palpable on exam - can monitor. Incidentally noted is mild bilateral periapical nerve sheath dilatation. Reviewed with neurology - incidental and typically benign. Pt has no symptoms. RESUSCITATION DIRECTIVES/HOSPICE CARE: Full Support    RTC in ~5-6 mo or sooner as needed     MDM  Number of Diagnoses or Management Options  Elevated serum globulin level: new, needed workup     Amount and/or Complexity of Data Reviewed  Clinical lab tests: ordered and reviewed  Tests in the radiology section of CPT®: reviewed  Obtain history from someone other than the patient: yes  Review and summarize past medical records: yes  Discuss the patient with other providers: yes  Independent visualization of images, tracings, or specimens: yes    Risk of Complications, Morbidity, and/or Mortality  Presenting problems: moderate  Diagnostic procedures: moderate  Management options: moderate        Lab studies and imaging studies were personally reviewed. Pertinent old records were reviewed. Historical:   - new dermal lesion over her right medial breast - somewhat violacoious in color - I think this should be considered for bx, considering her hx. Appears different from her other cherry angiomas. She agrees with the plan and will call her dermatologist.     - refer to surgery - Dr Luis Argueta; will plan on tumor board discussion   - we discussed her recent imaging and pathology in detail. Pt feels the mass has grown since bx. She has a hx of left breast excision - years ago - benign findings. 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 She stated that this required 3 surgeries to obtain clear margins.   No LN

## 2022-09-09 ENCOUNTER — HOSPITAL ENCOUNTER (OUTPATIENT)
Dept: LAB | Age: 62
Discharge: HOME | End: 2022-09-12
Payer: COMMERCIAL

## 2022-09-09 DIAGNOSIS — R77.1 ELEVATED SERUM GLOBULIN LEVEL: ICD-10-CM

## 2022-09-09 PROCEDURE — 83521 IG LIGHT CHAINS FREE EACH: CPT

## 2022-09-09 PROCEDURE — 36415 COLL VENOUS BLD VENIPUNCTURE: CPT

## 2022-09-09 PROCEDURE — 82784 ASSAY IGA/IGD/IGG/IGM EACH: CPT

## 2022-09-12 ENCOUNTER — HOSPITAL ENCOUNTER (OUTPATIENT)
Dept: LAB | Age: 62
Discharge: HOME | End: 2022-09-15
Payer: COMMERCIAL

## 2022-09-12 LAB
KAPPA LC FREE SER-MCNC: 20.3 MG/L (ref 3.3–19.4)
KAPPA LC FREE/LAMBDA FREE SER: 1.26 {RATIO} (ref 0.26–1.65)
LAMBDA LC FREE SERPL-MCNC: 16.1 MG/L (ref 5.7–26.3)

## 2022-09-12 PROCEDURE — 84166 PROTEIN E-PHORESIS/URINE/CSF: CPT

## 2022-09-13 LAB
ALBUMIN SERPL ELPH-MCNC: 3.6 G/DL (ref 2.9–4.4)
ALBUMIN/GLOB SERPL: 1.2 {RATIO} (ref 0.7–1.7)
ALPHA1 GLOB SERPL ELPH-MCNC: 0.2 G/DL (ref 0–0.4)
ALPHA2 GLOB SERPL ELPH-MCNC: 1 G/DL (ref 0.4–1)
B-GLOBULIN SERPL ELPH-MCNC: 1 G/DL (ref 0.7–1.3)
GAMMA GLOB SERPL ELPH-MCNC: 0.9 G/DL (ref 0.4–1.8)
GLOBULIN SER-MCNC: 3.1 G/DL (ref 2.2–3.9)
IGA SERPL-MCNC: 178 MG/DL (ref 87–352)
IGG SERPL-MCNC: 903 MG/DL (ref 586–1602)
IGM SERPL-MCNC: 211 MG/DL (ref 26–217)
INTERPRETATION SERPL IEP-IMP: NORMAL
M PROTEIN SERPL ELPH-MCNC: NORMAL G/DL
PROT SERPL-MCNC: 6.7 G/DL (ref 6–8.5)

## 2022-09-19 LAB
ALBUMIN 24H MFR UR ELPH: 100 %
ALPHA1 GLOB 24H MFR UR ELPH: 0 %
ALPHA2 GLOB 24H MFR UR ELPH: 0 %
B-GLOBULIN MFR UR ELPH: 0 %
COLLECT DURATION TIME UR: 24 HR
GAMMA GLOB 24H MFR UR ELPH: 0 %
INTERPRETATION UR IFE-IMP: NORMAL
Lab: NORMAL
M PROTEIN 24H MFR UR ELPH: NORMAL %
PROT 24H UR-MRATE: 116 MG/24 HR (ref 30–150)
PROT UR-MCNC: 6.8 MG/DL
SPECIMEN VOL ?TM UR: 1700 ML

## 2022-11-26 NOTE — PROGRESS NOTES
Lamont Barbosa is a 63 y.o. female who presents today for the following:  Chief Complaint   Patient presents with    Shoulder Pain     Cute shoulder pain         No Known Allergies    Current Outpatient Medications   Medication Sig Dispense Refill    atorvastatin (LIPITOR) 40 MG tablet Take 1 tablet by mouth in the morning. 30 tablet 5    lisinopril-hydroCHLOROthiazide (PRINZIDE;ZESTORETIC) 20-25 MG per tablet Take 0.5 tablets by mouth every evening 30 tablet 2    metFORMIN (GLUCOPHAGE) 1000 MG tablet Take 1 tablet by mouth in the morning and 1 tablet in the evening. Take with meals. TAKE 1 TABLET BY MOUTH TWICE A DAY WITH MEALS. 60 tablet 5    omeprazole (PRILOSEC) 20 MG delayed release capsule Take 1 capsule by mouth in the morning. 30 capsule 5    Multiple Vitamins-Minerals (THERAPEUTIC MULTIVITAMIN-MINERALS) tablet Take 1 tablet by mouth in the morning. aspirin 81 MG EC tablet Take 81 mg by mouth every evening Preventative Only-pt denies heart attack, stroke, blood clots and stents      docusate sodium (COLACE) 100 MG capsule Take 1 capsule by mouth in the morning and 1 capsule before bedtime. (Patient not taking: No sig reported) 60 capsule 0     No current facility-administered medications for this visit.        Past Medical History:   Diagnosis Date    Adenomatous polyp of colon 03/17/2017    Cancer ([address] 1993    skin; Dermatofibro sarcoma in the left upper abdomen    Diabetes ([address]     oral reliant; AVG ; pt denies s.s. of hypoglycemia; last A1C    Dysuria 2/18/2022    Fundic gland polyposis of stomach 03/17/2017    GERD (gastroesophageal reflux disease)     manage with med    Hyperlipidemia     Hypertension     managed with med    Sleep apnea     Sleeps with a CPAP    Urinary tract infection with hematuria 2/18/2022       Past Surgical History:   Procedure Laterality Date    CHEST WALL RESECTION Left 7/20/2022    LEFT CHEST WALL AND RIGHT ANTERIOR THIGH MASS EXCISION WITH FROZEN SECTION · Follows with ID as outpatient  No clear source of infection was identified  TANISHA did not show any valvular vegitation     · Completed IV vanco x 6 weeks on 11/9 performed by Delaney Burns DO at [facility] MAIN OR    CHOLECYSTECTOMY      HEENT      wisdom teeth    ORTHOPEDIC SURGERY Right 2005    ankle surgery    OTHER SURGICAL HISTORY Left 1994    cancer removal from left upper abdomen    US BREAST NEEDLE BIOPSY LEFT Left 05/13/2022    US BREAST NEEDLE BIOPSY LEFT 5/13/2022 [facility]       Social History     Tobacco Use    Smoking status: Never    Smokeless tobacco: Never   Substance Use Topics    Alcohol use: Yes     Comment: rarely        Family History   Problem Relation Age of Onset    Cancer Mother         breast       HPI   Patient presents for follow-up for conditions listed. She has a history of diabetes, hypertension, hyperlipidemia, GERD, fatty liver, sarcoma, obesity, and colon polyps. She is status post wide excision of left chest wall mass and right thigh mass with dx of Rosai-Paris disease. She has been referred to oncology appt next week for recommendations and treatment. Review of Systems   Constitutional:  Negative for fatigue. Respiratory:  Negative for shortness of breath and wheezing. Cardiovascular:  Negative for chest pain, palpitations and leg swelling. Gastrointestinal:  Negative for abdominal pain, blood in stool, constipation, diarrhea and nausea. Genitourinary: Negative. Skin: Negative. Neurological:  Negative for dizziness, numbness and headaches. Psychiatric/Behavioral:  Negative for dysphoric mood and sleep disturbance. The patient is not nervous/anxious. /80 (Site: Right Upper Arm, Position: Sitting, Cuff Size: Large Adult)   Pulse 72   Temp 97.9 °F (36.6 °C) (Oral)   Ht 5' 8\" (1.727 m)   Wt 277 lb 3.2 oz (125.7 kg)   SpO2 97%   BMI 42.15 kg/m²     Physical Exam  Constitutional:       General: She is not in acute distress. Appearance: Normal appearance. She is obese. She is not ill-appearing. HENT:      Head: Normocephalic and atraumatic.       Right Ear: External ear normal. Left Ear: External ear normal.   Eyes:      Extraocular Movements: Extraocular movements intact. Conjunctiva/sclera: Conjunctivae normal.      Pupils: Pupils are equal, round, and reactive to light. Cardiovascular:      Rate and Rhythm: Normal rate and regular rhythm. Pulses: Normal pulses. Heart sounds: Normal heart sounds. Pulmonary:      Effort: Pulmonary effort is normal.      Breath sounds: Normal breath sounds. Abdominal:      General: Bowel sounds are normal. There is no distension. Palpations: Abdomen is soft. There is no mass. Tenderness: There is no abdominal tenderness. Hernia: No hernia is present. Musculoskeletal:         General: Normal range of motion. Cervical back: Normal range of motion and neck supple. Right lower leg: No edema. Left lower leg: No edema. Skin:     General: Skin is warm and dry. Coloration: Skin is not jaundiced or pale. Neurological:      General: No focal deficit present. Mental Status: She is alert and oriented to person, place, and time. Psychiatric:         Mood and Affect: Mood normal.         Behavior: Behavior normal.         Thought Content: Thought content normal.         Judgment: Judgment normal.        1. Primary hypertension  Assessment & Plan:   Well-controlled, continue current medications, continue current treatment plan and lifestyle modifications recommended  Orders:  -     Basic Metabolic Panel; Future  2. Controlled type 2 diabetes mellitus without complication, without long-term current use of insulin (HCC)  Assessment & Plan:   Borderline controlled, continue current medications, continue current treatment plan, medication adherence emphasized, lifestyle modifications recommended and pt motivated to increase exercise and dietary compliance; declines diabetic education. She reports she has had previous education.   Patient prefers to hold off on adding another agent feels like she can get her A1c back down. Her A1c is at 7. Patient reports compliance with her metformin. Discussed with patient risks of uncontrolled diabetes such as heart attack, stroke, amputation, among others. Patient is up-to-date on her eye exam and follows regularly. Orders:  -     AMB POC HEMOGLOBIN A1C  -     Basic Metabolic Panel; Future  3. Medication management  -     AMB POC HEMOGLOBIN A1C  -     Basic Metabolic Panel; Future  4. FERCHO (obstructive sleep apnea)  Assessment & Plan:   reports compliance  5. Gastroesophageal reflux disease without esophagitis  Assessment & Plan:   Well-controlled, continue current medications  6. Obesity, morbid ([address]  Assessment & Plan:   Uncontrolled, continue current treatment plan, lifestyle modifications recommended and Patient has not been exercising as regularly due to recent surgery. Patient encouraged to exercise regularly and eat healthy diet. UTD with diabetic eye exam has appt. More sedentary after surgery. Pt believes she is UTD on pneumonia vaccine believes last completed 2020 and had dose previously before that at [facility].  Patient informed, we will call with blood work results within one week. If you have not heard regarding results in over a week, please contact office. You can also review results on [employer]. Follow-up and Dispositions    Return in about 3 months (around 11/12/2022) for Diabetes.          Fraser Burkitt, SHIRLEY - CNP

## 2023-03-23 ENCOUNTER — APPOINTMENT (RX ONLY)
Dept: URBAN - METROPOLITAN AREA CLINIC 23 | Facility: CLINIC | Age: 63
Setting detail: DERMATOLOGY
End: 2023-03-23

## 2023-03-23 DIAGNOSIS — L81.4 OTHER MELANIN HYPERPIGMENTATION: ICD-10-CM

## 2023-03-23 DIAGNOSIS — L57.8 OTHER SKIN CHANGES DUE TO CHRONIC EXPOSURE TO NONIONIZING RADIATION: ICD-10-CM

## 2023-03-23 DIAGNOSIS — Z71.89 OTHER SPECIFIED COUNSELING: ICD-10-CM

## 2023-03-23 DIAGNOSIS — D18.0 HEMANGIOMA: ICD-10-CM

## 2023-03-23 DIAGNOSIS — D22 MELANOCYTIC NEVI: ICD-10-CM

## 2023-03-23 PROBLEM — D22.39 MELANOCYTIC NEVI OF OTHER PARTS OF FACE: Status: ACTIVE | Noted: 2023-03-23

## 2023-03-23 PROBLEM — D18.01 HEMANGIOMA OF SKIN AND SUBCUTANEOUS TISSUE: Status: ACTIVE | Noted: 2023-03-23

## 2023-03-23 PROCEDURE — 99213 OFFICE O/P EST LOW 20 MIN: CPT

## 2023-03-23 PROCEDURE — ? COUNSELING

## 2023-03-23 ASSESSMENT — LOCATION SIMPLE DESCRIPTION DERM
LOCATION SIMPLE: LEFT CHEEK
LOCATION SIMPLE: LEFT ANTERIOR NECK
LOCATION SIMPLE: LEFT FOREARM
LOCATION SIMPLE: RIGHT FOREARM
LOCATION SIMPLE: LEFT BREAST
LOCATION SIMPLE: RIGHT BREAST
LOCATION SIMPLE: UPPER BACK
LOCATION SIMPLE: CHEST

## 2023-03-23 ASSESSMENT — LOCATION ZONE DERM
LOCATION ZONE: NECK
LOCATION ZONE: ARM
LOCATION ZONE: FACE
LOCATION ZONE: TRUNK

## 2023-03-23 ASSESSMENT — LOCATION DETAILED DESCRIPTION DERM
LOCATION DETAILED: LEFT CENTRAL MANDIBULAR CHEEK
LOCATION DETAILED: LEFT CLAVICULAR NECK
LOCATION DETAILED: SUPERIOR THORACIC SPINE
LOCATION DETAILED: RIGHT MEDIAL BREAST 1-2:00 REGION
LOCATION DETAILED: RIGHT MEDIAL SUPERIOR CHEST
LOCATION DETAILED: LEFT LATERAL BREAST 12-1:00 REGION
LOCATION DETAILED: RIGHT PROXIMAL DORSAL FOREARM
LOCATION DETAILED: LEFT PROXIMAL DORSAL FOREARM

## 2023-04-20 DIAGNOSIS — I10 ESSENTIAL (PRIMARY) HYPERTENSION: ICD-10-CM

## 2023-04-24 RX ORDER — LISINOPRIL AND HYDROCHLOROTHIAZIDE 25; 20 MG/1; MG/1
TABLET ORAL
Qty: 45 TABLET | Refills: 3 | OUTPATIENT
Start: 2023-04-24

## 2023-04-25 SDOH — ECONOMIC STABILITY: TRANSPORTATION INSECURITY
IN THE PAST 12 MONTHS, HAS LACK OF TRANSPORTATION KEPT YOU FROM MEETINGS, WORK, OR FROM GETTING THINGS NEEDED FOR DAILY LIVING?: NO

## 2023-04-25 SDOH — ECONOMIC STABILITY: HOUSING INSECURITY
IN THE LAST 12 MONTHS, WAS THERE A TIME WHEN YOU DID NOT HAVE A STEADY PLACE TO SLEEP OR SLEPT IN A SHELTER (INCLUDING NOW)?: NO

## 2023-04-25 SDOH — ECONOMIC STABILITY: INCOME INSECURITY: HOW HARD IS IT FOR YOU TO PAY FOR THE VERY BASICS LIKE FOOD, HOUSING, MEDICAL CARE, AND HEATING?: NOT HARD AT ALL

## 2023-04-25 SDOH — ECONOMIC STABILITY: FOOD INSECURITY: WITHIN THE PAST 12 MONTHS, THE FOOD YOU BOUGHT JUST DIDN'T LAST AND YOU DIDN'T HAVE MONEY TO GET MORE.: NEVER TRUE

## 2023-04-25 SDOH — ECONOMIC STABILITY: FOOD INSECURITY: WITHIN THE PAST 12 MONTHS, YOU WORRIED THAT YOUR FOOD WOULD RUN OUT BEFORE YOU GOT MONEY TO BUY MORE.: NEVER TRUE

## 2023-04-26 ENCOUNTER — HOSPITAL ENCOUNTER (OUTPATIENT)
Dept: GENERAL RADIOLOGY | Age: 63
Discharge: HOME | End: 2023-04-29
Payer: COMMERCIAL

## 2023-04-26 ENCOUNTER — OFFICE VISIT (OUTPATIENT)
Dept: RHEUMATOLOGY | Age: 63
End: 2023-04-26
Payer: COMMERCIAL

## 2023-04-26 VITALS
WEIGHT: 268 LBS | HEART RATE: 75 BPM | DIASTOLIC BLOOD PRESSURE: 92 MMHG | HEIGHT: 68 IN | BODY MASS INDEX: 40.62 KG/M2 | SYSTOLIC BLOOD PRESSURE: 141 MMHG

## 2023-04-26 DIAGNOSIS — E55.9 HYPOVITAMINOSIS D: ICD-10-CM

## 2023-04-26 DIAGNOSIS — R89.4 SEROLOGIC ABNORMALITY: Primary | ICD-10-CM

## 2023-04-26 DIAGNOSIS — M06.4 INFLAMMATORY POLYARTHRITIS (HCC): ICD-10-CM

## 2023-04-26 DIAGNOSIS — R89.4 SEROLOGIC ABNORMALITY: ICD-10-CM

## 2023-04-26 DIAGNOSIS — Z13.820 OSTEOPOROSIS SCREENING: ICD-10-CM

## 2023-04-26 DIAGNOSIS — R53.83 OTHER FATIGUE: ICD-10-CM

## 2023-04-26 DIAGNOSIS — M06.00 RHEUMATOID ARTHRITIS WITHOUT HIGH RHEUMATOID FACTOR (HCC): ICD-10-CM

## 2023-04-26 DIAGNOSIS — E66.01 OBESITY, CLASS III, BMI 40-49.9 (MORBID OBESITY) (HCC): ICD-10-CM

## 2023-04-26 LAB
ERYTHROCYTE [DISTWIDTH] IN BLOOD BY AUTOMATED COUNT: 14.1 % (ref 11.9–14.6)
ERYTHROCYTE [SEDIMENTATION RATE] IN BLOOD: 45 MM/HR (ref 0–30)
HCT VFR BLD AUTO: 40.9 % (ref 35.8–46.3)
HGB BLD-MCNC: 13.1 G/DL (ref 11.7–15.4)
MCH RBC QN AUTO: 27.7 PG (ref 26.1–32.9)
MCHC RBC AUTO-ENTMCNC: 32 G/DL (ref 31.4–35)
MCV RBC AUTO: 86.5 FL (ref 82–102)
NRBC # BLD: 0 K/UL (ref 0–0.2)
PLATELET # BLD AUTO: 308 K/UL (ref 150–450)
PMV BLD AUTO: 10.5 FL (ref 9.4–12.3)
RBC # BLD AUTO: 4.73 M/UL (ref 4.05–5.2)
WBC # BLD AUTO: 8.4 K/UL (ref 4.3–11.1)

## 2023-04-26 PROCEDURE — 73130 X-RAY EXAM OF HAND: CPT

## 2023-04-26 PROCEDURE — 3077F SYST BP >= 140 MM HG: CPT | Performed by: INTERNAL MEDICINE

## 2023-04-26 PROCEDURE — 3080F DIAST BP >= 90 MM HG: CPT | Performed by: INTERNAL MEDICINE

## 2023-04-26 PROCEDURE — 99245 OFF/OP CONSLTJ NEW/EST HI 55: CPT | Performed by: INTERNAL MEDICINE

## 2023-04-26 NOTE — PATIENT INSTRUCTIONS
Labs - cbc, cmp, esr, buck, lupus panel, ccp, hep panel, vit d ,   Reading material on RA   Xrays of hands BL - high RF and inflammatory arthritis   DEXA scan  RTC in 2-4 weeks - call if any issues

## 2023-04-27 LAB
25(OH)D3 SERPL-MCNC: 33.9 NG/ML (ref 30–100)
ALBUMIN SERPL-MCNC: 4.3 G/DL (ref 3.2–4.6)
ALBUMIN/GLOB SERPL: 1.3 (ref 0.4–1.6)
ALP SERPL-CCNC: 91 U/L (ref 50–136)
ALT SERPL-CCNC: 32 U/L (ref 12–65)
ANION GAP SERPL CALC-SCNC: 5 MMOL/L (ref 2–11)
AST SERPL-CCNC: 15 U/L (ref 15–37)
BILIRUB SERPL-MCNC: 0.3 MG/DL (ref 0.2–1.1)
BUN SERPL-MCNC: 12 MG/DL (ref 8–23)
CALCIUM SERPL-MCNC: 10 MG/DL (ref 8.3–10.4)
CCP IGA+IGG SERPL IA-ACNC: 3 UNITS (ref 0–19)
CENTROMERE B AB SER-ACNC: <0.2 AI (ref 0–0.9)
CHLORIDE SERPL-SCNC: 106 MMOL/L (ref 101–110)
CHROMATIN AB SERPL-ACNC: <0.2 AI (ref 0–0.9)
CO2 SERPL-SCNC: 28 MMOL/L (ref 21–32)
CREAT SERPL-MCNC: 0.7 MG/DL (ref 0.6–1)
DSDNA AB SER-ACNC: <1 IU/ML (ref 0–9)
ENA JO1 AB SER-ACNC: <0.2 AI (ref 0–0.9)
ENA RNP AB SER-ACNC: <0.2 AI (ref 0–0.9)
ENA SCL70 AB SER-ACNC: <0.2 AI (ref 0–0.9)
ENA SM AB SER-ACNC: <0.2 AI (ref 0–0.9)
ENA SS-A AB SER-ACNC: <0.2 AI (ref 0–0.9)
ENA SS-B AB SER-ACNC: <0.2 AI (ref 0–0.9)
GLOBULIN SER CALC-MCNC: 3.2 G/DL (ref 2.8–4.5)
GLUCOSE SERPL-MCNC: 116 MG/DL (ref 65–100)
HAV IGM SER QL: NONREACTIVE
HBV CORE IGM SER QL: NONREACTIVE
HBV SURFACE AG SER QL: NONREACTIVE
HCV AB SER QL: NONREACTIVE
Lab: NORMAL
POTASSIUM SERPL-SCNC: 4.1 MMOL/L (ref 3.5–5.1)
PROT SERPL-MCNC: 7.5 G/DL (ref 6.3–8.2)
RHEUMATOID FACT SER QL LA: POSITIVE
RHEUMATOID FACT TITR SER LA: NORMAL {TITER}
SODIUM SERPL-SCNC: 139 MMOL/L (ref 133–143)

## 2023-04-28 ENCOUNTER — OFFICE VISIT (OUTPATIENT)
Dept: FAMILY MEDICINE CLINIC | Facility: CLINIC | Age: 63
End: 2023-04-28
Payer: COMMERCIAL

## 2023-04-28 VITALS
SYSTOLIC BLOOD PRESSURE: 98 MMHG | DIASTOLIC BLOOD PRESSURE: 76 MMHG | BODY MASS INDEX: 40.62 KG/M2 | TEMPERATURE: 98 F | OXYGEN SATURATION: 96 % | HEIGHT: 68 IN | WEIGHT: 268 LBS | HEART RATE: 79 BPM

## 2023-04-28 DIAGNOSIS — K21.9 GASTROESOPHAGEAL REFLUX DISEASE WITHOUT ESOPHAGITIS: ICD-10-CM

## 2023-04-28 DIAGNOSIS — E66.01 OBESITY, MORBID (HCC): ICD-10-CM

## 2023-04-28 DIAGNOSIS — G47.33 OSA (OBSTRUCTIVE SLEEP APNEA): ICD-10-CM

## 2023-04-28 DIAGNOSIS — E78.5 HYPERLIPIDEMIA, UNSPECIFIED HYPERLIPIDEMIA TYPE: ICD-10-CM

## 2023-04-28 DIAGNOSIS — K21.00 GASTROESOPHAGEAL REFLUX DISEASE WITH ESOPHAGITIS, UNSPECIFIED WHETHER HEMORRHAGE: ICD-10-CM

## 2023-04-28 DIAGNOSIS — R80.9 MICROALBUMINURIA: ICD-10-CM

## 2023-04-28 DIAGNOSIS — E11.9 CONTROLLED TYPE 2 DIABETES MELLITUS WITHOUT COMPLICATION, WITHOUT LONG-TERM CURRENT USE OF INSULIN (HCC): ICD-10-CM

## 2023-04-28 DIAGNOSIS — I10 PRIMARY HYPERTENSION: Primary | ICD-10-CM

## 2023-04-28 DIAGNOSIS — K76.0 FATTY LIVER: ICD-10-CM

## 2023-04-28 DIAGNOSIS — D76.3 ROSAI-DORFMAN DISEASE (HCC): ICD-10-CM

## 2023-04-28 PROBLEM — E66.9 OBESITY WITH BODY MASS INDEX 30 OR GREATER: Status: ACTIVE | Noted: 2017-01-13

## 2023-04-28 LAB
CHOLEST SERPL-MCNC: 145 MG/DL
CREAT UR-MCNC: 52 MG/DL
HBA1C MFR BLD: 6.8 %
HDLC SERPL-MCNC: 51 MG/DL (ref 40–60)
HDLC SERPL: 2.8
LDLC SERPL CALC-MCNC: 60.8 MG/DL
MICROALBUMIN UR-MCNC: 0.7 MG/DL
MICROALBUMIN/CREAT UR-RTO: 13 MG/G (ref 0–30)
TRIGL SERPL-MCNC: 166 MG/DL (ref 35–150)
VLDLC SERPL CALC-MCNC: 33.2 MG/DL (ref 6–23)

## 2023-04-28 PROCEDURE — 3074F SYST BP LT 130 MM HG: CPT | Performed by: NURSE PRACTITIONER

## 2023-04-28 PROCEDURE — 99214 OFFICE O/P EST MOD 30 MIN: CPT | Performed by: NURSE PRACTITIONER

## 2023-04-28 PROCEDURE — 3078F DIAST BP <80 MM HG: CPT | Performed by: NURSE PRACTITIONER

## 2023-04-28 PROCEDURE — 83036 HEMOGLOBIN GLYCOSYLATED A1C: CPT | Performed by: NURSE PRACTITIONER

## 2023-04-28 RX ORDER — ATORVASTATIN CALCIUM 40 MG/1
40 TABLET, FILM COATED ORAL DAILY
Qty: 90 TABLET | Refills: 1 | Status: SHIPPED | OUTPATIENT
Start: 2023-04-28

## 2023-04-28 RX ORDER — LISINOPRIL AND HYDROCHLOROTHIAZIDE 25; 20 MG/1; MG/1
0.5 TABLET ORAL EVERY EVENING
Qty: 30 TABLET | Refills: 2 | Status: SHIPPED | OUTPATIENT
Start: 2023-04-28

## 2023-04-28 RX ORDER — OMEPRAZOLE 20 MG/1
20 CAPSULE, DELAYED RELEASE ORAL DAILY PRN
Qty: 90 CAPSULE | Refills: 1 | Status: SHIPPED | OUTPATIENT
Start: 2023-04-28

## 2023-04-28 ASSESSMENT — PATIENT HEALTH QUESTIONNAIRE - PHQ9
SUM OF ALL RESPONSES TO PHQ QUESTIONS 1-9: 0
1. LITTLE INTEREST OR PLEASURE IN DOING THINGS: 0
2. FEELING DOWN, DEPRESSED OR HOPELESS: 0
SUM OF ALL RESPONSES TO PHQ9 QUESTIONS 1 & 2: 0
SUM OF ALL RESPONSES TO PHQ QUESTIONS 1-9: 0

## 2023-04-28 ASSESSMENT — ENCOUNTER SYMPTOMS
EYES NEGATIVE: 1
WHEEZING: 0
COUGH: 0
GASTROINTESTINAL NEGATIVE: 1
SHORTNESS OF BREATH: 0

## 2023-04-28 NOTE — PROGRESS NOTES
tablet; Take 1 tablet by mouth 2 times daily (with meals) TAKE 1 TABLET BY MOUTH TWICE A DAY WITH MEALS, Disp-180 tablet, R-1Normal  3. FERCHO (obstructive sleep apnea)  4. Gastroesophageal reflux disease without esophagitis  -     omeprazole (PRILOSEC) 20 MG delayed release capsule; Take 1 capsule by mouth daily as needed (heartburn), Disp-90 capsule, R-1Normal  5. Fatty liver  6. Gastroesophageal reflux disease with esophagitis, unspecified whether hemorrhage  7. Obesity, morbid [address] [address]  8. Rosai-Paris disease ([facility]  9. Microalbuminuria  10. Hyperlipidemia, unspecified hyperlipidemia type  -     Lipid Panel; Future  -     atorvastatin (LIPITOR) 40 MG tablet; Take 1 tablet by mouth daily, Disp-90 tablet, R-1Normal     A1c 6.8. Recommended trial omeprazole prn has been on long term for reflux; no ulcers. No change in medications; bp stable; diabetes stable; tolerating statin. Continue to encourage healthy diet. Her A1c improved. Patient informed, we will call with blood work results within one week. If you have not heard regarding results in over a week, please contact office. You can also review results on [employer]. Follow-up and Dispositions    Return in about 4 months (around 8/28/2023) for Diabetes.          SHIRLEY Vidal - CNP

## 2023-05-19 ENCOUNTER — HOSPITAL ENCOUNTER (OUTPATIENT)
Dept: MAMMOGRAPHY | Age: 63
Discharge: HOME | End: 2023-05-19
Payer: COMMERCIAL

## 2023-05-19 DIAGNOSIS — E55.9 HYPOVITAMINOSIS D: ICD-10-CM

## 2023-05-19 DIAGNOSIS — R89.4 SEROLOGIC ABNORMALITY: ICD-10-CM

## 2023-05-19 DIAGNOSIS — M06.00 RHEUMATOID ARTHRITIS WITHOUT HIGH RHEUMATOID FACTOR (HCC): ICD-10-CM

## 2023-05-19 DIAGNOSIS — R53.83 OTHER FATIGUE: ICD-10-CM

## 2023-05-19 DIAGNOSIS — Z13.820 OSTEOPOROSIS SCREENING: ICD-10-CM

## 2023-05-19 DIAGNOSIS — E66.01 OBESITY, CLASS III, BMI 40-49.9 (MORBID OBESITY) (HCC): ICD-10-CM

## 2023-05-19 DIAGNOSIS — M06.4 INFLAMMATORY POLYARTHRITIS (HCC): ICD-10-CM

## 2023-05-19 PROCEDURE — 77080 DXA BONE DENSITY AXIAL: CPT

## 2023-12-15 DIAGNOSIS — Z13.820 OSTEOPOROSIS SCREENING: ICD-10-CM

## 2023-12-15 DIAGNOSIS — D76.3 ROSAI-DORFMAN DISEASE (HCC): ICD-10-CM

## 2023-12-15 DIAGNOSIS — M06.00 RHEUMATOID ARTHRITIS WITHOUT HIGH RHEUMATOID FACTOR (HCC): ICD-10-CM

## 2023-12-15 DIAGNOSIS — E55.9 HYPOVITAMINOSIS D: ICD-10-CM

## 2023-12-15 DIAGNOSIS — E66.01 OBESITY, CLASS III, BMI 40-49.9 (MORBID OBESITY) (HCC): ICD-10-CM

## 2023-12-15 DIAGNOSIS — M54.12 CERVICAL RADICULOPATHY: ICD-10-CM

## 2023-12-15 DIAGNOSIS — R89.4 SEROLOGIC ABNORMALITY: ICD-10-CM

## 2023-12-15 DIAGNOSIS — M06.4 INFLAMMATORY POLYARTHRITIS (HCC): ICD-10-CM

## 2023-12-15 DIAGNOSIS — R53.83 OTHER FATIGUE: ICD-10-CM

## 2023-12-15 DIAGNOSIS — M54.16 RADICULOPATHY, LUMBAR REGION: ICD-10-CM

## 2023-12-15 LAB
25(OH)D3 SERPL-MCNC: 32.5 NG/ML (ref 30–100)
ALBUMIN SERPL-MCNC: 3.8 G/DL (ref 3.2–4.6)
ALBUMIN/GLOB SERPL: 1.1 (ref 0.4–1.6)
ALP SERPL-CCNC: 88 U/L (ref 50–136)
ALT SERPL-CCNC: 34 U/L (ref 12–65)
ANION GAP SERPL CALC-SCNC: 6 MMOL/L (ref 2–11)
BILIRUB SERPL-MCNC: 0.3 MG/DL (ref 0.2–1.1)
BUN SERPL-MCNC: 9 MG/DL (ref 8–23)
CALCIUM SERPL-MCNC: 9.4 MG/DL (ref 8.3–10.4)
CHLORIDE SERPL-SCNC: 106 MMOL/L (ref 103–113)
CO2 SERPL-SCNC: 27 MMOL/L (ref 21–32)
CREAT SERPL-MCNC: 0.8 MG/DL (ref 0.6–1)
ERYTHROCYTE [DISTWIDTH] IN BLOOD BY AUTOMATED COUNT: 14.1 % (ref 11.9–14.6)
ERYTHROCYTE [SEDIMENTATION RATE] IN BLOOD: 36 MM/HR (ref 0–30)
GLOBULIN SER CALC-MCNC: 3.4 G/DL (ref 2.8–4.5)
GLUCOSE SERPL-MCNC: 144 MG/DL (ref 65–100)
HCT VFR BLD AUTO: 40.5 % (ref 35.8–46.3)
HGB BLD-MCNC: 12.7 G/DL (ref 11.7–15.4)
MCH RBC QN AUTO: 27.5 PG (ref 26.1–32.9)
MCHC RBC AUTO-ENTMCNC: 31.4 G/DL (ref 31.4–35)
MCV RBC AUTO: 87.9 FL (ref 82–102)
NRBC # BLD: 0 K/UL (ref 0–0.2)
PLATELET # BLD AUTO: 285 K/UL (ref 150–450)
PMV BLD AUTO: 10.3 FL (ref 9.4–12.3)
POTASSIUM SERPL-SCNC: 3.7 MMOL/L (ref 3.5–5.1)
PROT SERPL-MCNC: 7.2 G/DL (ref 6.3–8.2)
RBC # BLD AUTO: 4.61 M/UL (ref 4.05–5.2)
SODIUM SERPL-SCNC: 139 MMOL/L (ref 136–146)
WBC # BLD AUTO: 8 K/UL (ref 4.3–11.1)

## 2023-12-17 LAB — CCP IGA+IGG SERPL IA-ACNC: 2 UNITS (ref 0–19)

## 2023-12-18 LAB
CENTROMERE B AB SER-ACNC: <0.2 AI (ref 0–0.9)
CHROMATIN AB SERPL-ACNC: <0.2 AI (ref 0–0.9)
DSDNA AB SER-ACNC: <1 IU/ML (ref 0–9)
ENA JO1 AB SER-ACNC: <0.2 AI (ref 0–0.9)
ENA RNP AB SER-ACNC: 0.2 AI (ref 0–0.9)
ENA SCL70 AB SER-ACNC: <0.2 AI (ref 0–0.9)
ENA SM AB SER-ACNC: <0.2 AI (ref 0–0.9)
ENA SS-A AB SER-ACNC: <0.2 AI (ref 0–0.9)
ENA SS-B AB SER-ACNC: <0.2 AI (ref 0–0.9)
Lab: NORMAL
RHEUMATOID FACT SER QL LA: POSITIVE
RHEUMATOID FACT TITR SER LA: NORMAL {TITER}

## 2024-03-26 ENCOUNTER — APPOINTMENT (RX ONLY)
Dept: URBAN - METROPOLITAN AREA CLINIC 23 | Facility: CLINIC | Age: 64
Setting detail: DERMATOLOGY
End: 2024-03-26

## 2024-03-26 DIAGNOSIS — L57.8 OTHER SKIN CHANGES DUE TO CHRONIC EXPOSURE TO NONIONIZING RADIATION: ICD-10-CM

## 2024-03-26 DIAGNOSIS — Z71.89 OTHER SPECIFIED COUNSELING: ICD-10-CM

## 2024-03-26 DIAGNOSIS — D22 MELANOCYTIC NEVI: ICD-10-CM

## 2024-03-26 DIAGNOSIS — D18.0 HEMANGIOMA: ICD-10-CM

## 2024-03-26 DIAGNOSIS — L57.0 ACTINIC KERATOSIS: ICD-10-CM

## 2024-03-26 DIAGNOSIS — D485 NEOPLASM OF UNCERTAIN BEHAVIOR OF SKIN: ICD-10-CM

## 2024-03-26 PROBLEM — D22.5 MELANOCYTIC NEVI OF TRUNK: Status: ACTIVE | Noted: 2024-03-26

## 2024-03-26 PROBLEM — D22.39 MELANOCYTIC NEVI OF OTHER PARTS OF FACE: Status: ACTIVE | Noted: 2024-03-26

## 2024-03-26 PROBLEM — D18.01 HEMANGIOMA OF SKIN AND SUBCUTANEOUS TISSUE: Status: ACTIVE | Noted: 2024-03-26

## 2024-03-26 PROBLEM — D48.5 NEOPLASM OF UNCERTAIN BEHAVIOR OF SKIN: Status: ACTIVE | Noted: 2024-03-26

## 2024-03-26 PROCEDURE — ? SHAVE REMOVAL

## 2024-03-26 PROCEDURE — ? COUNSELING

## 2024-03-26 PROCEDURE — 11102 TANGNTL BX SKIN SINGLE LES: CPT | Mod: 59

## 2024-03-26 PROCEDURE — ? LIQUID NITROGEN

## 2024-03-26 PROCEDURE — 17000 DESTRUCT PREMALG LESION: CPT | Mod: 59

## 2024-03-26 PROCEDURE — 99213 OFFICE O/P EST LOW 20 MIN: CPT | Mod: 25

## 2024-03-26 PROCEDURE — ? BIOPSY BY SHAVE METHOD

## 2024-03-26 PROCEDURE — 17003 DESTRUCT PREMALG LES 2-14: CPT

## 2024-03-26 PROCEDURE — 11301 SHAVE SKIN LESION 0.6-1.0 CM: CPT

## 2024-03-26 ASSESSMENT — LOCATION DETAILED DESCRIPTION DERM
LOCATION DETAILED: RIGHT MEDIAL BREAST 1-2:00 REGION
LOCATION DETAILED: RIGHT RIB CAGE
LOCATION DETAILED: LEFT INFERIOR MEDIAL UPPER BACK
LOCATION DETAILED: LEFT MEDIAL UPPER BACK
LOCATION DETAILED: RIGHT SUPERIOR MEDIAL FOREHEAD
LOCATION DETAILED: RIGHT MEDIAL SUPERIOR CHEST
LOCATION DETAILED: LEFT CLAVICULAR NECK
LOCATION DETAILED: LEFT DISTAL DORSAL FOREARM
LOCATION DETAILED: RIGHT SUPERIOR UPPER BACK
LOCATION DETAILED: LEFT CENTRAL MANDIBULAR CHEEK
LOCATION DETAILED: LEFT LATERAL BREAST 12-1:00 REGION
LOCATION DETAILED: RIGHT DISTAL POSTERIOR UPPER ARM

## 2024-03-26 ASSESSMENT — LOCATION SIMPLE DESCRIPTION DERM
LOCATION SIMPLE: RIGHT FOREHEAD
LOCATION SIMPLE: ABDOMEN
LOCATION SIMPLE: LEFT BREAST
LOCATION SIMPLE: RIGHT BREAST
LOCATION SIMPLE: LEFT CHEEK
LOCATION SIMPLE: LEFT ANTERIOR NECK
LOCATION SIMPLE: LEFT FOREARM
LOCATION SIMPLE: CHEST
LOCATION SIMPLE: RIGHT UPPER BACK
LOCATION SIMPLE: RIGHT POSTERIOR UPPER ARM
LOCATION SIMPLE: LEFT UPPER BACK

## 2024-03-26 ASSESSMENT — LOCATION ZONE DERM
LOCATION ZONE: ARM
LOCATION ZONE: NECK
LOCATION ZONE: TRUNK
LOCATION ZONE: FACE

## 2024-03-26 NOTE — PROCEDURE: LIQUID NITROGEN
Duration Of Freeze Thaw-Cycle (Seconds): 4
Consent: The patient's consent was obtained including but not limited to risks of crusting, scabbing, blistering, scarring, darker or lighter pigmentary change, recurrence, incomplete removal and infection.
Number Of Freeze-Thaw Cycles: 2 freeze-thaw cycles
Show Aperture Variable?: Yes
Render Post-Care Instructions In Note?: no
Post-Care Instructions: I reviewed with the patient in detail post-care instructions. Patient is to wear sunprotection, and avoid picking at any of the treated lesions. Pt may apply Vaseline to crusted or scabbing areas.
Detail Level: Detailed

## 2024-06-12 DIAGNOSIS — E66.01 OBESITY, CLASS III, BMI 40-49.9 (MORBID OBESITY) (HCC): ICD-10-CM

## 2024-06-12 DIAGNOSIS — M06.4 INFLAMMATORY POLYARTHRITIS (HCC): Primary | ICD-10-CM

## 2024-06-12 DIAGNOSIS — D76.3 ROSAI-DORFMAN DISEASE (HCC): ICD-10-CM

## 2024-06-12 DIAGNOSIS — R53.83 OTHER FATIGUE: ICD-10-CM

## 2024-06-12 DIAGNOSIS — R76.8 RHEUMATOID FACTOR POSITIVE: ICD-10-CM

## 2024-06-12 DIAGNOSIS — R89.4 SEROLOGIC ABNORMALITY: ICD-10-CM

## 2024-06-12 DIAGNOSIS — M06.00 RHEUMATOID ARTHRITIS WITHOUT HIGH RHEUMATOID FACTOR (HCC): ICD-10-CM

## 2024-06-12 DIAGNOSIS — E55.9 HYPOVITAMINOSIS D: ICD-10-CM

## 2024-06-19 DIAGNOSIS — R76.8 RHEUMATOID FACTOR POSITIVE: ICD-10-CM

## 2024-06-19 DIAGNOSIS — M06.4 INFLAMMATORY POLYARTHRITIS (HCC): ICD-10-CM

## 2024-06-19 DIAGNOSIS — R53.83 OTHER FATIGUE: ICD-10-CM

## 2024-06-19 DIAGNOSIS — M06.00 RHEUMATOID ARTHRITIS WITHOUT HIGH RHEUMATOID FACTOR (HCC): ICD-10-CM

## 2024-06-19 DIAGNOSIS — R89.4 SEROLOGIC ABNORMALITY: ICD-10-CM

## 2024-06-19 DIAGNOSIS — E55.9 HYPOVITAMINOSIS D: ICD-10-CM

## 2024-06-19 DIAGNOSIS — E66.01 OBESITY, CLASS III, BMI 40-49.9 (MORBID OBESITY) (HCC): ICD-10-CM

## 2024-06-19 DIAGNOSIS — D76.3 ROSAI-DORFMAN DISEASE (HCC): ICD-10-CM

## 2024-06-19 LAB
25(OH)D3 SERPL-MCNC: 63.5 NG/ML (ref 30–100)
ALBUMIN SERPL-MCNC: 4.2 G/DL (ref 3.2–4.6)
ALBUMIN/GLOB SERPL: 1.3 (ref 1–1.9)
ALP SERPL-CCNC: 93 U/L (ref 35–104)
ALT SERPL-CCNC: 35 U/L (ref 12–65)
ANION GAP SERPL CALC-SCNC: 12 MMOL/L (ref 9–18)
AST SERPL-CCNC: 26 U/L (ref 15–37)
BILIRUB SERPL-MCNC: 0.3 MG/DL (ref 0–1.2)
BUN SERPL-MCNC: 10 MG/DL (ref 8–23)
CALCIUM SERPL-MCNC: 9.9 MG/DL (ref 8.8–10.2)
CHLORIDE SERPL-SCNC: 101 MMOL/L (ref 98–107)
CO2 SERPL-SCNC: 27 MMOL/L (ref 20–28)
CREAT SERPL-MCNC: 0.68 MG/DL (ref 0.6–1.1)
ERYTHROCYTE [DISTWIDTH] IN BLOOD BY AUTOMATED COUNT: 14.3 % (ref 11.9–14.6)
ERYTHROCYTE [SEDIMENTATION RATE] IN BLOOD: 31 MM/HR (ref 0–30)
GLOBULIN SER CALC-MCNC: 3.2 G/DL (ref 2.3–3.5)
GLUCOSE SERPL-MCNC: 116 MG/DL (ref 70–99)
HCT VFR BLD AUTO: 41.6 % (ref 35.8–46.3)
HGB BLD-MCNC: 13.3 G/DL (ref 11.7–15.4)
MCH RBC QN AUTO: 27.7 PG (ref 26.1–32.9)
MCHC RBC AUTO-ENTMCNC: 32 G/DL (ref 31.4–35)
MCV RBC AUTO: 86.7 FL (ref 82–102)
NRBC # BLD: 0 K/UL (ref 0–0.2)
PLATELET # BLD AUTO: 311 K/UL (ref 150–450)
PMV BLD AUTO: 10.2 FL (ref 9.4–12.3)
POTASSIUM SERPL-SCNC: 4.4 MMOL/L (ref 3.5–5.1)
PROT SERPL-MCNC: 7.4 G/DL (ref 6.3–8.2)
RBC # BLD AUTO: 4.8 M/UL (ref 4.05–5.2)
SODIUM SERPL-SCNC: 140 MMOL/L (ref 136–145)
WBC # BLD AUTO: 9.7 K/UL (ref 4.3–11.1)

## 2024-06-20 LAB
CCP IGA+IGG SERPL IA-ACNC: 5 UNITS (ref 0–19)
CENTROMERE B AB SER-ACNC: <0.2 AI (ref 0–0.9)
CHROMATIN AB SERPL-ACNC: <0.2 AI (ref 0–0.9)
DSDNA AB SER-ACNC: <1 IU/ML (ref 0–9)
ENA JO1 AB SER-ACNC: <0.2 AI (ref 0–0.9)
ENA RNP AB SER-ACNC: 0.2 AI (ref 0–0.9)
ENA SCL70 AB SER-ACNC: <0.2 AI (ref 0–0.9)
ENA SM AB SER-ACNC: <0.2 AI (ref 0–0.9)
ENA SS-A AB SER-ACNC: <0.2 AI (ref 0–0.9)
ENA SS-B AB SER-ACNC: <0.2 AI (ref 0–0.9)
Lab: NORMAL
RHEUMATOID FACT SER QL LA: POSITIVE
RHEUMATOID FACT TITR SER LA: NORMAL {TITER}

## 2024-06-27 ENCOUNTER — TELEMEDICINE (OUTPATIENT)
Dept: RHEUMATOLOGY | Age: 64
End: 2024-06-27
Payer: COMMERCIAL

## 2024-06-27 DIAGNOSIS — M06.00 RHEUMATOID ARTHRITIS WITHOUT HIGH RHEUMATOID FACTOR (HCC): ICD-10-CM

## 2024-06-27 DIAGNOSIS — R89.4 SEROLOGIC ABNORMALITY: ICD-10-CM

## 2024-06-27 DIAGNOSIS — R53.83 OTHER FATIGUE: ICD-10-CM

## 2024-06-27 DIAGNOSIS — M06.4 INFLAMMATORY POLYARTHRITIS (HCC): Primary | ICD-10-CM

## 2024-06-27 DIAGNOSIS — R76.8 RHEUMATOID FACTOR POSITIVE: ICD-10-CM

## 2024-06-27 DIAGNOSIS — E55.9 HYPOVITAMINOSIS D: ICD-10-CM

## 2024-06-27 DIAGNOSIS — E66.01 OBESITY, CLASS III, BMI 40-49.9 (MORBID OBESITY) (HCC): ICD-10-CM

## 2024-06-27 DIAGNOSIS — R25.1 TREMOR OF BOTH HANDS: ICD-10-CM

## 2024-06-27 DIAGNOSIS — D76.3 ROSAI-DORFMAN DISEASE (HCC): ICD-10-CM

## 2024-06-27 PROCEDURE — 99214 OFFICE O/P EST MOD 30 MIN: CPT | Performed by: INTERNAL MEDICINE

## 2024-06-27 RX ORDER — LISINOPRIL/HYDROCHLOROTHIAZIDE 10-12.5 MG
1 TABLET ORAL DAILY
COMMUNITY
Start: 2024-05-17

## 2024-06-27 NOTE — PROGRESS NOTES
counseling.        Plan:       Labs - cbc, cmp, esr, buck, lupus panel, ccp,  vit d , before next visit  Vitamin D can take 2000 units daily for now  Labs -  TSH, patient with new intentional tremor  Follow-up with PCP regarding tremor  5. RTC in 1 year with labs- call if any issues         We discussed the expected course, resolution and complications of the diagnosis(es) in detail.  Medication risks, benefits, costs, interactions, and alternatives were discussed as indicated.  I advised her to contact the office if her condition worsens, changes or fails to improve as anticipated. She expressed understanding with the diagnosis(es) and plan.     Marlin Galo, was evaluated through a synchronous (real-time) audio-video encounter. The patient (or guardian if applicable) is aware that this is a billable service, which includes applicable co-pays. This Virtual Visit was conducted with patient's (and/or legal guardian's) consent. Patient identification was verified, and a caregiver was present when appropriate.   The patient was located at Home: [address] [place] [phone]  Provider was located at Home (Appt Dept State): [place]  Confirm you are appropriately licensed, registered, or certified to deliver care in the state where the patient is located as indicated above. If you are not or unsure, please re-schedule the visit: Yes, I confirm.      Total time spent for this encounter: 35 minutes, greater than half of the time was spent on counseling.      --Jen James MD on 9/4/2024 at 9:27 AM    An electronic signature was used to authenticate this note.     Services were provided through a video synchronous discussion virtually to substitute for in-person clinic visit.

## 2024-06-27 NOTE — PATIENT INSTRUCTIONS
Labs - cbc, cmp, esr, buck, lupus panel, ccp,  vit d , before next visit  Vitamin D can take 2000 units daily for now  Labs -  TSH, patient with new intentional tremor  Follow-up with PCP regarding tremor  5. RTC in 1 year with labs- call if any issues     1 year, Lab NOW-BS,Labs BS prior  Lab NOW- TSH  Labs Prior- CBC CMP ESR BUCK CCP RF VITD

## 2024-07-01 ENCOUNTER — TELEPHONE (OUTPATIENT)
Dept: RHEUMATOLOGY | Age: 64
End: 2024-07-01

## 2024-07-01 NOTE — TELEPHONE ENCOUNTER
VV 6/27, Dx RA, see pt message below, I did tell her you dont have to have a new referral unless its been after 3 years so you can call their office to schedule a follow up as well if Dr James wanted you to see them for follow up.       I saw Dr. Fuentes in the past. She diagnosed me with Rosai Paris Disease. Dr. James and I were talking about follow up and Dr. James said she would contact Dr. Fuentes. I just wanted verification that this was done   Thank you-  Marlin Galo

## 2024-07-03 NOTE — TELEPHONE ENCOUNTER
Replied thru My chart, Hi, per Dr James, she did speak to dr Fuentes and their office should be reaching out to you, thanks

## 2025-03-27 ENCOUNTER — APPOINTMENT (OUTPATIENT)
Dept: URBAN - METROPOLITAN AREA CLINIC 23 | Facility: CLINIC | Age: 65
Setting detail: DERMATOLOGY
End: 2025-03-27

## 2025-03-27 DIAGNOSIS — D22 MELANOCYTIC NEVI: ICD-10-CM

## 2025-03-27 DIAGNOSIS — L57.8 OTHER SKIN CHANGES DUE TO CHRONIC EXPOSURE TO NONIONIZING RADIATION: ICD-10-CM

## 2025-03-27 DIAGNOSIS — D76.3 OTHER HISTIOCYTOSIS SYNDROMES: ICD-10-CM

## 2025-03-27 DIAGNOSIS — Z71.89 OTHER SPECIFIED COUNSELING: ICD-10-CM

## 2025-03-27 DIAGNOSIS — L57.0 ACTINIC KERATOSIS: ICD-10-CM

## 2025-03-27 DIAGNOSIS — D18.0 HEMANGIOMA: ICD-10-CM

## 2025-03-27 PROBLEM — D18.01 HEMANGIOMA OF SKIN AND SUBCUTANEOUS TISSUE: Status: ACTIVE | Noted: 2025-03-27

## 2025-03-27 PROBLEM — D22.62 MELANOCYTIC NEVI OF LEFT UPPER LIMB, INCLUDING SHOULDER: Status: ACTIVE | Noted: 2025-03-27

## 2025-03-27 PROBLEM — D22.5 MELANOCYTIC NEVI OF TRUNK: Status: ACTIVE | Noted: 2025-03-27

## 2025-03-27 PROBLEM — D22.39 MELANOCYTIC NEVI OF OTHER PARTS OF FACE: Status: ACTIVE | Noted: 2025-03-27

## 2025-03-27 PROCEDURE — 99213 OFFICE O/P EST LOW 20 MIN: CPT | Mod: 25

## 2025-03-27 PROCEDURE — 17000 DESTRUCT PREMALG LESION: CPT

## 2025-03-27 PROCEDURE — ? LIQUID NITROGEN

## 2025-03-27 PROCEDURE — ? COUNSELING

## 2025-03-27 PROCEDURE — ? OTHER

## 2025-03-27 ASSESSMENT — LOCATION DETAILED DESCRIPTION DERM
LOCATION DETAILED: RIGHT RIB CAGE
LOCATION DETAILED: LEFT INFERIOR MEDIAL UPPER BACK
LOCATION DETAILED: LEFT POSTERIOR SHOULDER
LOCATION DETAILED: LEFT CLAVICULAR NECK
LOCATION DETAILED: LEFT LATERAL BREAST 12-1:00 REGION
LOCATION DETAILED: RIGHT DISTAL POSTERIOR UPPER ARM
LOCATION DETAILED: RIGHT MEDIAL BREAST 1-2:00 REGION
LOCATION DETAILED: LEFT VENTRAL LATERAL DISTAL FOREARM
LOCATION DETAILED: RIGHT MEDIAL SUPERIOR CHEST
LOCATION DETAILED: LEFT CENTRAL MANDIBULAR CHEEK
LOCATION DETAILED: RIGHT SUPERIOR UPPER BACK

## 2025-03-27 ASSESSMENT — LOCATION ZONE DERM
LOCATION ZONE: NECK
LOCATION ZONE: TRUNK
LOCATION ZONE: FACE
LOCATION ZONE: ARM

## 2025-03-27 ASSESSMENT — LOCATION SIMPLE DESCRIPTION DERM
LOCATION SIMPLE: ABDOMEN
LOCATION SIMPLE: RIGHT POSTERIOR UPPER ARM
LOCATION SIMPLE: RIGHT BREAST
LOCATION SIMPLE: LEFT BREAST
LOCATION SIMPLE: LEFT FOREARM
LOCATION SIMPLE: RIGHT UPPER BACK
LOCATION SIMPLE: LEFT SHOULDER
LOCATION SIMPLE: LEFT ANTERIOR NECK
LOCATION SIMPLE: LEFT UPPER BACK
LOCATION SIMPLE: CHEST
LOCATION SIMPLE: LEFT CHEEK

## 2025-03-27 NOTE — PROCEDURE: OTHER
Render Risk Assessment In Note?: no
Note Text (......Xxx Chief Complaint.): This diagnosis correlates with the
Other (Free Text): If any of the lesions become irritated or bothersome, we can remove then with a shave removal.
Detail Level: Zone

## 2025-03-27 NOTE — PROCEDURE: LIQUID NITROGEN
Render Post-Care Instructions In Note?: no
Number Of Freeze-Thaw Cycles: 1 freeze-thaw cycle
Show Aperture Variable?: Yes
Detail Level: Detailed
Post-Care Instructions: I reviewed with the patient in detail post-care instructions. Patient is to wear sunprotection, and avoid picking at any of the treated lesions. Pt may apply Vaseline to crusted or scabbing areas.
Duration Of Freeze Thaw-Cycle (Seconds): 4
Consent: The patient's consent was obtained including but not limited to risks of crusting, scabbing, blistering, scarring, darker or lighter pigmentary change, recurrence, incomplete removal and infection.

## 2025-05-30 DIAGNOSIS — R25.1 TREMOR OF BOTH HANDS: ICD-10-CM

## 2025-05-30 DIAGNOSIS — R53.83 OTHER FATIGUE: ICD-10-CM

## 2025-05-30 DIAGNOSIS — E66.813 OBESITY, CLASS III, BMI 40-49.9 (MORBID OBESITY) (HCC): ICD-10-CM

## 2025-05-30 DIAGNOSIS — E55.9 HYPOVITAMINOSIS D: ICD-10-CM

## 2025-05-30 DIAGNOSIS — M06.4 INFLAMMATORY POLYARTHRITIS (HCC): ICD-10-CM

## 2025-05-30 DIAGNOSIS — R89.4 SEROLOGIC ABNORMALITY: ICD-10-CM

## 2025-05-30 DIAGNOSIS — D76.3 ROSAI-DORFMAN DISEASE (HCC): ICD-10-CM

## 2025-05-30 DIAGNOSIS — M06.00 RHEUMATOID ARTHRITIS WITHOUT HIGH RHEUMATOID FACTOR (HCC): ICD-10-CM

## 2025-05-30 DIAGNOSIS — R76.8 RHEUMATOID FACTOR POSITIVE: ICD-10-CM

## 2025-05-30 LAB
25(OH)D3 SERPL-MCNC: 40 NG/ML (ref 30–100)
ALBUMIN SERPL-MCNC: 3.7 G/DL (ref 3.2–4.6)
ALBUMIN/GLOB SERPL: 1.1 (ref 1–1.9)
ALP SERPL-CCNC: 94 U/L (ref 35–104)
ALT SERPL-CCNC: 32 U/L (ref 8–45)
ANION GAP SERPL CALC-SCNC: 14 MMOL/L (ref 7–16)
AST SERPL-CCNC: 23 U/L (ref 15–37)
BILIRUB SERPL-MCNC: 0.4 MG/DL (ref 0–1.2)
BUN SERPL-MCNC: 10 MG/DL (ref 8–23)
CALCIUM SERPL-MCNC: 9.6 MG/DL (ref 8.8–10.2)
CHLORIDE SERPL-SCNC: 100 MMOL/L (ref 98–107)
CO2 SERPL-SCNC: 26 MMOL/L (ref 20–29)
CREAT SERPL-MCNC: 0.68 MG/DL (ref 0.6–1.1)
ERYTHROCYTE [DISTWIDTH] IN BLOOD BY AUTOMATED COUNT: 14.2 % (ref 11.9–14.6)
ERYTHROCYTE [SEDIMENTATION RATE] IN BLOOD: 13 MM/HR (ref 0–30)
GLOBULIN SER CALC-MCNC: 3.3 G/DL (ref 2.3–3.5)
GLUCOSE SERPL-MCNC: 137 MG/DL (ref 70–99)
HCT VFR BLD AUTO: 40 % (ref 35.8–46.3)
HGB BLD-MCNC: 12.7 G/DL (ref 11.7–15.4)
MCH RBC QN AUTO: 27.7 PG (ref 26.1–32.9)
MCHC RBC AUTO-ENTMCNC: 31.8 G/DL (ref 31.4–35)
MCV RBC AUTO: 87.1 FL (ref 82–102)
NRBC # BLD: 0 K/UL (ref 0–0.2)
PLATELET # BLD AUTO: 268 K/UL (ref 150–450)
PMV BLD AUTO: 10.2 FL (ref 9.4–12.3)
POTASSIUM SERPL-SCNC: 4.2 MMOL/L (ref 3.5–5.1)
PROT SERPL-MCNC: 7 G/DL (ref 6.3–8.2)
RBC # BLD AUTO: 4.59 M/UL (ref 4.05–5.2)
SODIUM SERPL-SCNC: 140 MMOL/L (ref 136–145)
TSH, 3RD GENERATION: 1.56 UIU/ML (ref 0.27–4.2)
WBC # BLD AUTO: 9 K/UL (ref 4.3–11.1)

## 2025-05-31 LAB
CENTROMERE B AB SER-ACNC: <0.2 AI (ref 0–0.9)
CHROMATIN AB SERPL-ACNC: <0.2 AI (ref 0–0.9)
DSDNA AB SER-ACNC: <1 IU/ML (ref 0–9)
ENA JO1 AB SER-ACNC: <0.2 AI (ref 0–0.9)
ENA RNP AB SER-ACNC: <0.2 AI (ref 0–0.9)
ENA SCL70 AB SER-ACNC: <0.2 AI (ref 0–0.9)
ENA SM AB SER-ACNC: <0.2 AI (ref 0–0.9)
ENA SS-A AB SER-ACNC: <0.2 AI (ref 0–0.9)
ENA SS-B AB SER-ACNC: <0.2 AI (ref 0–0.9)
Lab: NORMAL

## 2025-06-02 LAB
CCP IGA+IGG SERPL IA-ACNC: 7 UNITS (ref 0–19)
RHEUMATOID FACT SER QL LA: NEGATIVE

## 2025-06-16 NOTE — PROGRESS NOTES
Order for nebulizer in chart-please fax to number listed-thanks or Ex-Partner: No     Emotionally Abused: No     Physically Abused: No     Sexually Abused: No   Housing Stability: Not At Risk (5/12/2023)    Received from [facility]    Housing Stability     Was there a time when you did not have a steady place to sleep: No     Worried that the place you are staying is making you sick: No           Objective:      Vitals:    06/18/25 1248   Weight: 122.9 kg (271 lb)   Height: 1.727 m (5' 8\")         PHYSICAL EXAMINATION:        General: alert, healthy, well nourished, pleasant, no acute distress  Lungs: clear to auscultation bilaterally without wheezes,    Heart: regular rate & rhythm, +S1, +S2, no murmurs  Extremities: no clubbing, cyanosis, or edema  Neuro: grossly non-focal,       MUSCULOSKELETAL:   -Hands: Minimal tenderness in the MCPs on the right  -Wrists: Mild tenderness on the right CMC  -Elbows: normal   -Shoulders: normal   -Neck: Normal  -Back: normal   -Hips: normal   -Knees: normal   -Ankles: Full-Feet: Normal    Swollen Joint Count:0  Tender Joint Count: 2      Assessment:         Mrs. Traylor is a very pleasant 65-year-old  lady with past medical history significant for diabetes, GERD, hyperlipidemia, hypertension, sleep apnea, and arthritis who presents for follow-up of positive rheumatoid factor and elevated ESR.    Extensive review of records from PCP, laboratory data, oncology summarized as normal CBC, CMP, positive rheumatoid factor, elevated sed rate at 35- 45.  Further work-up shows normal DEXA scan, unremarkable x-rays of hands.  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    Clinical picture with mild intermittent inflammatory symptoms, morning stiffness, joint involvement, improvement with activity, though very minimal, patient is not having to take regular medications to treat this.  We have offered some

## 2025-06-18 ENCOUNTER — OFFICE VISIT (OUTPATIENT)
Dept: RHEUMATOLOGY | Age: 65
End: 2025-06-18
Payer: COMMERCIAL

## 2025-06-18 VITALS
HEART RATE: 80 BPM | DIASTOLIC BLOOD PRESSURE: 79 MMHG | WEIGHT: 271 LBS | SYSTOLIC BLOOD PRESSURE: 123 MMHG | HEIGHT: 68 IN | BODY MASS INDEX: 41.07 KG/M2

## 2025-06-18 DIAGNOSIS — D76.3 ROSAI-DORFMAN DISEASE (HCC): Primary | ICD-10-CM

## 2025-06-18 DIAGNOSIS — R89.4 SEROLOGIC ABNORMALITY: ICD-10-CM

## 2025-06-18 DIAGNOSIS — E66.813 OBESITY, CLASS III, BMI 40-49.9 (MORBID OBESITY) (HCC): ICD-10-CM

## 2025-06-18 DIAGNOSIS — M06.4 INFLAMMATORY POLYARTHRITIS (HCC): ICD-10-CM

## 2025-06-18 DIAGNOSIS — R25.1 TREMOR OF BOTH HANDS: ICD-10-CM

## 2025-06-18 DIAGNOSIS — E55.9 HYPOVITAMINOSIS D: ICD-10-CM

## 2025-06-18 DIAGNOSIS — R76.8 RHEUMATOID FACTOR POSITIVE: ICD-10-CM

## 2025-06-18 DIAGNOSIS — R53.83 OTHER FATIGUE: ICD-10-CM

## 2025-06-18 PROCEDURE — 3074F SYST BP LT 130 MM HG: CPT | Performed by: INTERNAL MEDICINE

## 2025-06-18 PROCEDURE — 3078F DIAST BP <80 MM HG: CPT | Performed by: INTERNAL MEDICINE

## 2025-06-18 PROCEDURE — 99214 OFFICE O/P EST MOD 30 MIN: CPT | Performed by: INTERNAL MEDICINE

## 2025-06-18 RX ORDER — MULTIVIT-MIN/IRON/FOLIC ACID/K 18-600-40
2000 CAPSULE ORAL EVERY OTHER DAY
COMMUNITY

## 2025-06-18 NOTE — PATIENT INSTRUCTIONS
Labs - cbc, cmp, esr, buck, lupus panel, ccp,  vit d , before next visit  Vitamin D continue 2000 units daily for now   RTC in 1 year with labs- call if any issues     1YR,Labs BS prior

## (undated) DEVICE — STAPLER SKIN SQ 30 ABSRB STPL DISP INSORB

## (undated) DEVICE — SUTURE MCRYL SZ 3-0 L27IN ABSRB UD L19MM PS-2 3/8 CIR PRIM Y427H

## (undated) DEVICE — BLADE ES ELASTOMERIC COAT INSUL DURABLE BEND UPTO 90DEG

## (undated) DEVICE — SUTURE PROL SZ 3-0 L18IN NONABSORBABLE BLU L19MM PS-2 3/8 8687H

## (undated) DEVICE — SOLUTION IRRIG 1000ML 09% SOD CHL USP PIC PLAS CONTAINER

## (undated) DEVICE — STRIP,CLOSURE,WOUND,MEDI-STRIP,1/2X4: Brand: MEDLINE

## (undated) DEVICE — MINOR SPLIT GENERAL: Brand: MEDLINE INDUSTRIES, INC.

## (undated) DEVICE — GLOVE SURG SZ 65 THK91MIL LTX FREE SYN POLYISOPRENE

## (undated) DEVICE — PENCIL ES L3M BTTN SWCH HOLSTER W/ BLDE ELECTRD EDGE

## (undated) DEVICE — SUTURE MCRYL SZ 3-0 L27IN ABSRB UD L26MM SH 1/2 CIR Y416H

## (undated) DEVICE — MASTISOL ADHESIVE LIQ 2/3ML

## (undated) DEVICE — GLOVE SURG SZ 7 L12IN FNGR THK79MIL GRN LTX FREE

## (undated) DEVICE — SOLUTION SCRB 0.04 CHG DYNA HEX

## (undated) DEVICE — NEEDLE HYPO 21GA L1.5IN INTRAMUSCULAR S STL LATCH BVL UP